# Patient Record
Sex: MALE | Race: WHITE | NOT HISPANIC OR LATINO | Employment: UNEMPLOYED | URBAN - METROPOLITAN AREA
[De-identification: names, ages, dates, MRNs, and addresses within clinical notes are randomized per-mention and may not be internally consistent; named-entity substitution may affect disease eponyms.]

---

## 2018-05-09 ENCOUNTER — OFFICE VISIT (OUTPATIENT)
Dept: MEDICAL GROUP | Facility: MEDICAL CENTER | Age: 58
End: 2018-05-09
Payer: COMMERCIAL

## 2018-05-09 VITALS
BODY MASS INDEX: 21.72 KG/M2 | HEIGHT: 67 IN | SYSTOLIC BLOOD PRESSURE: 106 MMHG | TEMPERATURE: 98 F | RESPIRATION RATE: 16 BRPM | HEART RATE: 58 BPM | WEIGHT: 138.4 LBS | OXYGEN SATURATION: 98 % | DIASTOLIC BLOOD PRESSURE: 60 MMHG

## 2018-05-09 DIAGNOSIS — Z13.6 SCREENING FOR ISCHEMIC HEART DISEASE: ICD-10-CM

## 2018-05-09 DIAGNOSIS — F41.9 ANXIETY: ICD-10-CM

## 2018-05-09 DIAGNOSIS — L65.9 HAIR LOSS: ICD-10-CM

## 2018-05-09 DIAGNOSIS — R06.83 SNORING: ICD-10-CM

## 2018-05-09 DIAGNOSIS — R53.82 CHRONIC FATIGUE: ICD-10-CM

## 2018-05-09 DIAGNOSIS — Z13.1 SCREENING FOR DIABETES MELLITUS: ICD-10-CM

## 2018-05-09 DIAGNOSIS — J32.0 CHRONIC MAXILLARY SINUSITIS: ICD-10-CM

## 2018-05-09 DIAGNOSIS — Z72.89 OTHER PROBLEMS RELATED TO LIFESTYLE: ICD-10-CM

## 2018-05-09 PROBLEM — J32.9 SINUSITIS: Status: ACTIVE | Noted: 2018-05-09

## 2018-05-09 PROCEDURE — 99204 OFFICE O/P NEW MOD 45 MIN: CPT | Performed by: FAMILY MEDICINE

## 2018-05-09 RX ORDER — FINASTERIDE 5 MG/1
5 TABLET, FILM COATED ORAL DAILY
COMMUNITY
Start: 2017-06-03 | End: 2019-11-12

## 2018-05-09 ASSESSMENT — PATIENT HEALTH QUESTIONNAIRE - PHQ9: CLINICAL INTERPRETATION OF PHQ2 SCORE: 0

## 2018-05-09 ASSESSMENT — PAIN SCALES - GENERAL: PAINLEVEL: NO PAIN

## 2018-05-09 NOTE — ASSESSMENT & PLAN NOTE
Patient complains of chronic fatigue. Interestingly, he states the fatigue is localized around the left side of his head and face. He isn't sure if this is related to his sinus issues. He wonders if he might have low thyroid.

## 2018-05-09 NOTE — ASSESSMENT & PLAN NOTE
The patient describes a long-standing history of moderate severity anxiety with OCD tendencies. He describes these tendencies as compulsion to bite his mouth. He denies depression. He has tried Zoloft in the past but could not tolerate it due to digestive issues. He notices that exercise does help his symptoms.

## 2018-05-09 NOTE — LETTER
[facility]  Kunal Quach M.D.  [address]  [facility] [address]  Fax: [phone]   Authorization for Release/Disclosure of   Protected Health Information   Name: VENKATA FUNES : 1960 SSN: [id number]   Address: [address] [place] [address] Phone:    [phone] (home)    I authorize the entity listed below to release/disclose the PHI below to:   [facility]/Kunal Quach M.D. and Kunal Quach M.D.   Provider or Entity Name:  Dr.Norman Guillaume   Address   City, [facility], [place], [place] Phone:      Fax:     Reason for request: continuity of care   Information to be released:    [  ] LAST COLONOSCOPY,  including any PATH REPORT and follow-up  [  ] LAST FIT/COLOGUARD RESULT [  ] LAST DEXA  [  ] LAST MAMMOGRAM  [  ] LAST PAP  [  ] LAST LABS [  ] RETINA EXAM REPORT  [  ] IMMUNIZATION RECORDS  [X  ] Release all info      [  ] Check here and initial the line next to each item to release ALL health information INCLUDING  _____ Care and treatment for drug and / or alcohol abuse  _____ HIV testing, infection status, or AIDS  _____ Genetic Testing    DATES OF SERVICE OR TIME PERIOD TO BE DISCLOSED: _____________  I understand and acknowledge that:  * This Authorization may be revoked at any time by you in writing, except if your health information has already been used or disclosed.  * Your health information that will be used or disclosed as a result of you signing this authorization could be re-disclosed by the recipient. If this occurs, your re-disclosed health information may no longer be protected by State or Federal laws.  * You may refuse to sign this Authorization. Your refusal will not affect your ability to obtain treatment.  * This Authorization becomes effective upon signing and will  on (date) __________.      If no date is indicated, this Authorization will  one (1) year from the signature date.    Name: Venkata Funes    Signature:   Date:          5/9/2018       PLEASE FAX REQUESTED RECORDS BACK TO: [phone]

## 2018-05-09 NOTE — PROGRESS NOTES
[facility]  Progress Note  New Patient    Subjective:   Venkata Funes is a 57 y.o. male here today with a chief complaint of anxiety. This is a new patient to me. The patient comes in alone.     Anxiety  The patient describes a long-standing history of moderate severity anxiety with OCD tendencies. He describes these tendencies as compulsion to bite his mouth. He denies depression. He has tried Zoloft in the past but could not tolerate it due to digestive issues. He notices that exercise does help his symptoms.    Sinusitis  Patient describes a long-standing history of sinusitis, worse in the left maxillary area. The patient has had a number of sinus surgeries but none of them have been that effective. He is tried nasal sprays and Neti pot with limited success. He continues to complain of intermittent left maxillary pressure.    Chronic fatigue  Patient complains of chronic fatigue. Interestingly, he states the fatigue is localized around the left side of his head and face. He isn't sure if this is related to his sinus issues. He wonders if he might have low thyroid.    Hair loss  He takes minoxidil and finasteride for hair loss. He tolerates this medication well.    Snoring  The patient snores and expresses fatigue, discussed elsewhere.      No current outpatient prescriptions on file prior to visit.     No current facility-administered medications on file prior to visit.        Past Medical History:   Diagnosis Date   • Allergy    • Anxiety    • Cataract    • Sinusitis        Allergies: Patient has no known allergies.    Surgical History:  has a past surgical history that includes cataract extraction with iol; nasal septal reconstruction; eye surgery; and vasectomy.    Family History: family history includes Depression in his daughter.    Social History:  reports that he has never smoked. He has never used smokeless tobacco. He reports that he drinks alcohol. He reports that he does not use  "drugs.    ROS:   Constitutional ROS: No unexplained fevers, sweats. Occasional chills.  Eye ROS: No eye pain, redness, discharge  Ear ROS: No ear pain  Mouth/Throat ROS: No sore throat  Neck ROS: No swollen glands  Pulmonary ROS: No shortness of breath  Cardiovascular ROS: No chest pain  Gastrointestinal ROS: No abdominal pain  Musculoskeletal/Extremities ROS: No cyanosis  Neurologic ROS: No seizures  Psychiatric ROS: No depression       Objective:     Vitals:    05/09/18 1014   BP: 106/60   Pulse: (!) 58   Resp: 16   Temp: 36.7 °C (98 °F)   SpO2: 98%   Weight: 62.8 kg (138 lb 6.4 oz)   Height: 1.702 m (5' 7\")       Physical Exam:  Constitutional: Alert, no distress.  Skin: Warm, dry, good turgor, no rashes in visible areas.  Eye: Equal, round and reactive, conjunctiva clear, lids normal.  ENMT: Lips without lesions, good dentition, oropharynx clear with cobblestoning and bilateral hypertrophic nasal turbinates.  Neck: Trachea midline, no masses, no thyromegaly. No cervical or supraclavicular lymphadenopathy  Respiratory: Unlabored respiratory effort, lungs clear to auscultation, no wheezes, no ronchi.  Cardiovascular: Normal S1, S2, no murmur, no edema.  Abdomen: Soft, non-tender, no masses, no hepatosplenomegaly.  Psych: Alert and oriented, anxious affect and mood.         Assessment and Plan:     1. Chronic maxillary sinusitis  - continue nasal steroids, NetiPot  - REFERRAL TO ENT, to Dr. Malave.    2. Chronic fatigue  An unusual description. May be related to sinusitis. Will also perform workup below.   - HEMOGLOBIN AND HEMATOCRIT; Future  - TSH WITH REFLEX TO FT4; Future  - REFERRAL TO SLEEP STUDIES    3. Hair loss  - continue current regimen.     4. Snoring  - REFERRAL TO SLEEP STUDIES    5. Screening for ischemic heart disease  - LIPID PROFILE; Future    6. Screening for diabetes mellitus  - BASIC METABOLIC PANEL; Future    7. Other problems related to lifestyle  - HEP C VIRUS ANTIBODY; Future    8. " Anxiety  Failed zoloft. I think he'd benefit from CBT, especially considering his obsessive compulsive tendencies.   - REFERRAL TO PSYCHOLOGY, to PhD specializing in CBT.        Followup: Return in about 2 months (around 7/9/2018), or if symptoms worsen or fail to improve.

## 2018-05-09 NOTE — LETTER
[facility]  Kunal Quach M.D.  [address]  [facility] [address]  Fax: [phone]   Authorization for Release/Disclosure of   Protected Health Information   Name: VENKATA FUNES : 1960 SSN: [id number]   Address: [address] [place] [address] Phone:    [phone] (home)    I authorize the entity listed below to release/disclose the PHI below to:   [facility]/Kunal Quach M.D. and Kunal Quach M.D.   Provider or Entity Name:     Address   City, [facility], [place], [place] Phone:      Fax:     Reason for request: continuity of care   Information to be released:    [X  ] LAST COLONOSCOPY,  including any PATH REPORT and follow-up  [  ] LAST FIT/COLOGUARD RESULT [  ] LAST DEXA  [  ] LAST MAMMOGRAM  [  ] LAST PAP  [  ] LAST LABS [  ] RETINA EXAM REPORT  [  ] IMMUNIZATION RECORDS  [  ] Release all info      [  ] Check here and initial the line next to each item to release ALL health information INCLUDING  _____ Care and treatment for drug and / or alcohol abuse  _____ HIV testing, infection status, or AIDS  _____ Genetic Testing    DATES OF SERVICE OR TIME PERIOD TO BE DISCLOSED: _____________  I understand and acknowledge that:  * This Authorization may be revoked at any time by you in writing, except if your health information has already been used or disclosed.  * Your health information that will be used or disclosed as a result of you signing this authorization could be re-disclosed by the recipient. If this occurs, your re-disclosed health information may no longer be protected by State or Federal laws.  * You may refuse to sign this Authorization. Your refusal will not affect your ability to obtain treatment.  * This Authorization becomes effective upon signing and will  on (date) __________.      If no date is indicated, this Authorization will  one (1) year from the signature date.    Name: Venkata Funes    Signature:   Date:      5/9/2018       PLEASE FAX REQUESTED RECORDS BACK TO: [phone]

## 2018-05-09 NOTE — ASSESSMENT & PLAN NOTE
Patient describes a long-standing history of sinusitis, worse in the left maxillary area. The patient has had a number of sinus surgeries but none of them have been that effective. He is tried nasal sprays and Neti pot with limited success. He continues to complain of intermittent left maxillary pressure.

## 2018-07-09 ENCOUNTER — OFFICE VISIT (OUTPATIENT)
Dept: MEDICAL GROUP | Facility: MEDICAL CENTER | Age: 58
End: 2018-07-09
Payer: COMMERCIAL

## 2018-07-09 VITALS
DIASTOLIC BLOOD PRESSURE: 70 MMHG | HEIGHT: 67 IN | SYSTOLIC BLOOD PRESSURE: 110 MMHG | WEIGHT: 137.79 LBS | HEART RATE: 64 BPM | RESPIRATION RATE: 18 BRPM | TEMPERATURE: 97.2 F | BODY MASS INDEX: 21.63 KG/M2 | OXYGEN SATURATION: 97 %

## 2018-07-09 DIAGNOSIS — J32.0 CHRONIC MAXILLARY SINUSITIS: ICD-10-CM

## 2018-07-09 DIAGNOSIS — F41.9 ANXIETY: ICD-10-CM

## 2018-07-09 DIAGNOSIS — Z00.00 HEALTHCARE MAINTENANCE: ICD-10-CM

## 2018-07-09 DIAGNOSIS — E78.5 DYSLIPIDEMIA: ICD-10-CM

## 2018-07-09 DIAGNOSIS — R00.2 PALPITATIONS: ICD-10-CM

## 2018-07-09 PROCEDURE — 99214 OFFICE O/P EST MOD 30 MIN: CPT | Mod: 25 | Performed by: FAMILY MEDICINE

## 2018-07-09 PROCEDURE — 93000 ELECTROCARDIOGRAM COMPLETE: CPT | Performed by: FAMILY MEDICINE

## 2018-07-09 NOTE — ASSESSMENT & PLAN NOTE
The patient has slight dyslipidemia with a 10 year ASCVD risk of 4.66%. He denies chest pain and shortness of breath.

## 2018-07-09 NOTE — ASSESSMENT & PLAN NOTE
The patient states that recently he has noticed occasional palpitations. He states that about one time per day he notices a skipped beat. Sometimes she also has presyncopal symptoms but denies any syncopal activity. He states that he will get occasionally lightheaded when he stands up from a bending over position. He denies associated chest pain or shortness of breath. Recent CMP, TSH and H/H were normal.

## 2018-07-09 NOTE — ASSESSMENT & PLAN NOTE
Patient recently saw Dr. Malave (ENT) for his sinusitis. Dr. Malave is not interested in doing surgery. He did do a CT scan.

## 2018-07-09 NOTE — ASSESSMENT & PLAN NOTE
Lipids: done 2018 with 10 year ASCVD risk 4.66%. No indication for ASA or statin.   Fasting Glucose: done 2018 and normal.    Hepatitis C Screen: done 2018 and normal.   Colonoscopy: done 11/16/17 with 5 year recall.     Tdap: given 2012.

## 2018-07-09 NOTE — ASSESSMENT & PLAN NOTE
The patient has made contact with a therapist for his anxiety and OCD symptoms. He is not sure if this is helping.

## 2018-07-09 NOTE — PROGRESS NOTES
[facility]  Progress Note  Established Patient    Subjective:   Venkata Funes is a 57 y.o. male here today with a chief complaint of palpitations. The patient is alone.     Healthcare maintenance  Lipids: done 2018 with 10 year ASCVD risk 4.66%. No indication for ASA or statin.   Fasting Glucose: done 2018 and normal.    Hepatitis C Screen: done 2018 and normal.   Colonoscopy: done 11/16/17 with 5 year recall.     Tdap: given 2012.     Palpitations  The patient states that recently he has noticed occasional palpitations. He states that about one time per day he notices a skipped beat. Sometimes she also has presyncopal symptoms but denies any syncopal activity. He states that he will get occasionally lightheaded when he stands up from a bending over position. He denies associated chest pain or shortness of breath. Recent CMP, TSH and H/H were normal.     Sinusitis  Patient recently saw Dr. Malave (ENT) for his sinusitis. Dr. Malave is not interested in doing surgery. He did do a CT scan.     Dyslipidemia  The patient has slight dyslipidemia with a 10 year ASCVD risk of 4.66%. He denies chest pain and shortness of breath.    Anxiety  The patient has made contact with a therapist for his anxiety and OCD symptoms. He is not sure if this is helping.      Current Outpatient Prescriptions on File Prior to Visit   Medication Sig Dispense Refill   • finasteride (PROSCAR) 5 MG Tab Take 5 mg by mouth every day.     • MINOXIDIL EX 0.1 mL by Apply externally route every day.       No current facility-administered medications on file prior to visit.        Past Medical History:   Diagnosis Date   • Allergy    • Anxiety    • Cataract    • Sinusitis        Allergies: Patient has no known allergies.    Surgical History:  has a past surgical history that includes cataract extraction with iol; nasal septal reconstruction; eye surgery; and vasectomy.    Family History: family history includes Depression in his  "daughter.    Social History:  reports that he has never smoked. He has never used smokeless tobacco. He reports that he drinks alcohol. He reports that he does not use drugs.    ROS: no CP or SOB.        Objective:     Vitals:    07/09/18 0948   BP: 110/70   Pulse: 64   Resp: 18   Temp: 36.2 °C (97.2 °F)   SpO2: 97%   Weight: 62.5 kg (137 lb 12.6 oz)   Height: 1.702 m (5' 7\")       Physical Exam:  General: alert in no apparent distress.   Cardio: regular rate and rhythm, no murmurs, rubs or gallops.   Resp: CTAB no w/r/r.   Cardio: RRR no m/r/g. No carotid bruit.     EKG: precordial upsloping BINA not changed from previous EKG.     Orthostatics: 98/68 66, 102/70 68, 10/72 80        Assessment and Plan:     1. Dyslipidemia  No indication for ASA or statin.   - continue diet and exercise.     2. Anxiety  - f/u psychology.     3. Healthcare maintenance  - see HPI.     4. Palpitations  With presyncope that sounds orthostatic in nature. No syncope. Exam normal. EKG reassuring. Orthostatics suggestive of orthostasis.  - 2-3 liters water per day with salty snack.   - holter monitor.   - call if worsens or new sx.   - avoid over-exertion.      5. Chronic maxillary sinusitis  - f/u ENT.   - records requested.         Followup: Return in about 2 months (around 9/9/2018), or if symptoms worsen or fail to improve.         "

## 2018-08-10 ENCOUNTER — NON-PROVIDER VISIT (OUTPATIENT)
Dept: CARDIOLOGY | Facility: MEDICAL CENTER | Age: 58
End: 2018-08-10
Payer: COMMERCIAL

## 2018-08-10 DIAGNOSIS — R00.2 PALPITATIONS: ICD-10-CM

## 2018-08-21 LAB — EKG IMPRESSION: NORMAL

## 2018-08-21 PROCEDURE — 93224 XTRNL ECG REC UP TO 48 HRS: CPT | Performed by: INTERNAL MEDICINE

## 2018-08-29 ENCOUNTER — APPOINTMENT (OUTPATIENT)
Dept: SLEEP MEDICINE | Facility: MEDICAL CENTER | Age: 58
End: 2018-08-29
Payer: COMMERCIAL

## 2018-09-10 ENCOUNTER — OFFICE VISIT (OUTPATIENT)
Dept: MEDICAL GROUP | Facility: MEDICAL CENTER | Age: 58
End: 2018-09-10
Payer: COMMERCIAL

## 2018-09-10 VITALS
TEMPERATURE: 98.4 F | DIASTOLIC BLOOD PRESSURE: 62 MMHG | OXYGEN SATURATION: 97 % | HEART RATE: 60 BPM | HEIGHT: 67 IN | BODY MASS INDEX: 21.56 KG/M2 | SYSTOLIC BLOOD PRESSURE: 122 MMHG | WEIGHT: 137.4 LBS

## 2018-09-10 DIAGNOSIS — Z23 NEED FOR VACCINATION: ICD-10-CM

## 2018-09-10 DIAGNOSIS — R53.82 CHRONIC FATIGUE: ICD-10-CM

## 2018-09-10 DIAGNOSIS — J32.0 CHRONIC MAXILLARY SINUSITIS: ICD-10-CM

## 2018-09-10 DIAGNOSIS — R00.2 PALPITATIONS: ICD-10-CM

## 2018-09-10 DIAGNOSIS — E78.5 DYSLIPIDEMIA: ICD-10-CM

## 2018-09-10 PROCEDURE — 90471 IMMUNIZATION ADMIN: CPT | Performed by: FAMILY MEDICINE

## 2018-09-10 PROCEDURE — 90686 IIV4 VACC NO PRSV 0.5 ML IM: CPT | Performed by: FAMILY MEDICINE

## 2018-09-10 PROCEDURE — 99214 OFFICE O/P EST MOD 30 MIN: CPT | Mod: 25 | Performed by: FAMILY MEDICINE

## 2018-09-10 NOTE — ASSESSMENT & PLAN NOTE
"For the past few months the patient has described \"fatigue\" and \"fuzziness\" localized around the left side of his head and face. He does have sinusitis and is seeing ENT for this issue. He also has an upcoming sleep study. He states that his symptoms are much better, however, on occasion he will notice them. He also has anxiety.  "

## 2018-09-10 NOTE — ASSESSMENT & PLAN NOTE
At the last visit the patient described occasional palpitations and lightheadedness. H&H, CMP and TSH are normal. Holter monitoring is reassuring. The patient states the symptoms are much better now.

## 2018-09-10 NOTE — ASSESSMENT & PLAN NOTE
"Patient recently saw Dr. Malave (ENT) for his sinusitis. They elected to treat this with nasal saline rinses and flonase. The patient states that his symptoms are under reasonable control. He also describes some \"fatigue\" in the left frontal sinus area. See discussion regarding \"chronic fatigue\".   "

## 2018-09-10 NOTE — PROGRESS NOTES
"[facility]  Progress Note  Established Patient    Subjective:   Venkata Funes is a 57 y.o. male here today with a chief complaint of sinusitis. The patient is alone.     Sinusitis  Patient recently saw Dr. Malave (ENT) for his sinusitis. They elected to treat this with nasal saline rinses and flonase. The patient states that his symptoms are under reasonable control. He also describes some \"fatigue\" in the left frontal sinus area. See discussion regarding \"chronic fatigue\".     Palpitations  At the last visit the patient described occasional palpitations and lightheadedness. H&H, CMP and TSH are normal. Holter monitoring is reassuring. The patient states the symptoms are much better now.    Dyslipidemia  The patient has slight dyslipidemia with a 10 year ASCVD risk of 4.66%. He tries to stay active.    Chronic fatigue  For the past few months the patient has described \"fatigue\" and \"fuzziness\" localized around the left side of his head and face. He does have sinusitis and is seeing ENT for this issue. He also has an upcoming sleep study. He states that his symptoms are much better, however, on occasion he will notice them. He also has anxiety.      Current Outpatient Prescriptions on File Prior to Visit   Medication Sig Dispense Refill   • finasteride (PROSCAR) 5 MG Tab Take 5 mg by mouth every day.     • MINOXIDIL EX 0.1 mL by Apply externally route every day.       No current facility-administered medications on file prior to visit.        Past Medical History:   Diagnosis Date   • Allergy    • Anxiety    • Cataract    • Sinusitis        Allergies: Patient has no known allergies.    Surgical History:  has a past surgical history that includes cataract extraction with iol; nasal septal reconstruction; eye surgery; and vasectomy.    Family History: family history includes Depression in his daughter.    Social History:  reports that he has never smoked. He has never used smokeless tobacco. He reports that he " "drinks alcohol. He reports that he does not use drugs.    ROS: no fever or nausea.        Objective:     Vitals:    09/10/18 1027   BP: 122/62   Pulse: 60   Temp: 36.9 °C (98.4 °F)   SpO2: 97%   Weight: 62.3 kg (137 lb 6.4 oz)   Height: 1.702 m (5' 7\")       Physical Exam:  General: alert in no apparent distress.   Gait: normal.         Assessment and Plan:     1. Need for vaccination  - INFLUENZA VACCINE QUAD INJ >3Y(PF)    2. Chronic maxillary sinusitis  - f/u ENT.     3. Palpitations  Holter reassuring, symptoms greatly improved.   - supportive care.     4. Dyslipidemia  - diet and exercise.     5. Chronic fatigue  See HPI for description. DDx includes sinusitis. Also consider anxiety. I wonder if sleep apnea is playing a role. Nonetheless, I'm reassured that he's doing better.   - will treat sleep apnea and see how he does from there. If no further improvement, may consider further labs or possibly imaging.        Followup: Return in about 4 months (around 1/10/2019), or if symptoms worsen or fail to improve, for Wellness Visit, Long.         "

## 2018-10-03 ENCOUNTER — SLEEP CENTER VISIT (OUTPATIENT)
Dept: SLEEP MEDICINE | Facility: MEDICAL CENTER | Age: 58
End: 2018-10-03
Payer: COMMERCIAL

## 2018-10-03 VITALS
HEIGHT: 67 IN | RESPIRATION RATE: 15 BRPM | HEART RATE: 56 BPM | SYSTOLIC BLOOD PRESSURE: 114 MMHG | WEIGHT: 134 LBS | DIASTOLIC BLOOD PRESSURE: 70 MMHG | BODY MASS INDEX: 21.03 KG/M2 | OXYGEN SATURATION: 97 %

## 2018-10-03 DIAGNOSIS — G47.30 SLEEP DISORDER BREATHING: ICD-10-CM

## 2018-10-03 PROCEDURE — 99203 OFFICE O/P NEW LOW 30 MIN: CPT | Performed by: FAMILY MEDICINE

## 2018-10-03 RX ORDER — IBUPROFEN 200 MG
200 TABLET ORAL EVERY 6 HOURS PRN
COMMUNITY

## 2018-10-03 NOTE — PROGRESS NOTES
"     [facility]  Consult Note     Date: 10/3/2018 / Time: 1:53 PM    Patient ID:   Name:             Venkata Funes     YOB: 1960  Age:                 57 y.o.  male   MRN:               [id number]      Thank you for requesting a sleep medicine consultation on Venkata Funes at the sleep center. He presents today with the chief complaints of snoring, witnessed apneas and excessive daytime sleepiness. He is referred by  for evaluation and treatment of sleep disorder breathing .     HISTORY OF PRESENT ILLNESS:       At night,  Venkata Funes goes to bed around 10:30 pm on weekdays and pm on weekends. He gets out of bed at 8 am on weekdays and on weekends.He averages 9.5 hrs of sleep on a good night and 7 hrs on a bad night. Pt has bad nights rare.He falls asleep within 5 minutes. He awakens 1-2 times a night due to no obvious reason. It takes him less than 5 min to fall back asleep.    He is not aware of snoring/gasping or choking in sleep.  He  denies any symptoms of restless legs syndrome such as an \"urge to move\"  He  legs in the evening or bedtime. He  denies any symptoms of narcolepsy such as sleep paralysis or cataplexy, or any symptoms to suggest parasomnias such as sleep walking or acting out of dreams. He   Uses benadryl and tylenol pm once a week as a sleep aide.    Overall,  He doesnot finds his sleep refreshing. When He  wakes up in the morning, He does feel tired. In terms of  excessive daytime sleepiness,  He complains of sleepiness while reading or watching TV . [place] sleepiness scale score is abnormal at  14/24.   He does not take regular naps.     REVIEW OF SYSTEMS:       Constitutional: Denies fevers, Denies weight changes  Eyes: Denies changes in vision, no eye pain  Ears/Nose/Throat/Mouth: Denies nasal congestion or sore throat   Cardiovascular: Denies chest pain or palpitations   Respiratory: Denies shortness of breath , Denies " "cough  Gastrointestinal/Hepatic: Denies abdominal pain, nausea, vomiting, diarrhea, constipation or GI bleeding   Genitourinary: Denies bladder dysfunction, dysuria or frequency  Musculoskeletal/Rheum: Denies  joint pain and swelling   Skin/Breast: Denies rash, denies breast lumps or discharge  Neurological: Denies headache, confusion, memory loss or focal weakness/parasthesias  Psychiatric: denies mood disorder     Comprehensive review of systems form is reviewed with the patient and is attached in the EMR.     PMH:  has a past medical history of Allergy; Anxiety; Back pain; Cataract; Chickenpox; and Sinusitis.  MEDS:   Current Outpatient Prescriptions:   •  CREATINE MONOHYDRATE PO, Take  by mouth., Disp: , Rfl:   •  Multiple Vitamin (MULTI-VITAMIN PO), Take  by mouth., Disp: , Rfl:   •  ibuprofen (MOTRIN) 200 MG Tab, Take 200 mg by mouth every 6 hours as needed., Disp: , Rfl:   •  Diphenhydramine-APAP, sleep, (TYLENOL PM EXTRA STRENGTH PO), Take  by mouth., Disp: , Rfl:   •  finasteride (PROSCAR) 5 MG Tab, Take 5 mg by mouth every day., Disp: , Rfl:   •  MINOXIDIL EX, 0.1 mL by Apply externally route every day., Disp: , Rfl:   ALLERGIES: No Known Allergies  SURGHX:   Past Surgical History:   Procedure Laterality Date   • CATARACT EXTRACTION WITH IOL     • EYE SURGERY     • NASAL SEPTAL RECONSTRUCTION     • VASECTOMY       SOCHX:  reports that he has never smoked. He has never used smokeless tobacco. He reports that he drinks alcohol. He reports that he does not use drugs...   FH:   Family History   Problem Relation Age of Onset   • Depression Daughter    • Heart Failure Father    • Stroke Neg Hx            Physical Exam:  Vitals/ General Appearance:   Weight/BMI: Body mass index is 20.99 kg/m².  Blood pressure 114/70, pulse (!) 56, resp. rate 15, height 1.702 m (5' 7\"), weight 60.8 kg (134 lb), SpO2 97 %.  Vitals:    10/03/18 1344   BP: 114/70   BP Location: Left arm   Patient Position: Sitting   BP Cuff Size: " "Adult   Pulse: (!) 56   Resp: 15   SpO2: 97%   Weight: 60.8 kg (134 lb)   Height: 1.702 m (5' 7\")       Pt. is alert and oriented to time, place and person. Cooperative and in no apparent distress.       1. Head: Atraumatic, normocephalic.   2. Ears: Normal tympanic membrane and no discharge  3. Nose: No inferior turbinate hypertophy, +septal deviation  4. Throat: Oropharynx appears crowded in that the palate is overhanging (Mal[place] scale 3. Tonsils are not enlarged, uvula is large, pharynx not inflamed. Tongue is large. has intact dentition and oral hygiene is fair.  5. Neck: Supple. No thyromegaly  6. Chest: Trachea central  7. Lungs auscultation: B/L good air entry, vesicular breath sounds, no adventitious sounds  8. Heart auscultation: 1st and 2nd heart sounds normal, regular rhythm. No appreciable murmur.  9.  Extremities:  no clubbing, no pedal edema.   Peripheral pulses felt.  10. Skin: No rash  11. NEUROLOGICAL EXAMINATION: On neurological exam, the patient was alert and oriented x3. speech was clear and fluent without dysarthria.     INVESTIGATIONS:       ASSESSMENT AND PLAN     1. He  has symptoms of Obstructive Sleep Apnea (DONNIE). He  has excessive daytime sleepiness that interferes with activites of daily living. He  risk factors for DONNIE include crowded oropharynx.     The pathophysiology of DONNIE and the increased risk of cardiovascular morbidity from untreated DONNIE is discussed in detail with the patient.     We have discussed diagnostic options including in-laboratory, attended polysomnography and home sleep testing. We have also discussed treatment options including airway pressurization, reconstructive otolaryngologic surgery, dental appliances and weight management.       Subsequently,treatment options will be discussed based on the diagnostic study. Meanwhile, He is urged to avoid supine sleep, weight gain and alcoholic beverages since all of these can worsen DONNIE. He is cautioned against drowsy " driving. If He feels sleepy while driving, He must pull over for a break/nap, rather than persist on the road, in the interest of He own safety and that of others on the road.    Plan  -  He  will be scheduled for an overnight HST to assess sleep related breathing disorder.    2.Regarding treatment of other past medical problems and general health maintenance,  He is urged to follow up with PCP.

## 2018-10-03 NOTE — PATIENT INSTRUCTIONS
Sleep hygiene (ref: UpToDate)  ?Sleep as long as necessary to feel rested (usually seven to eight hours for adults) and then get out of bed  ?Maintain a regular sleep schedule, particularly a regular wake-up time in the morning  ?Try not to force sleep  ?Avoid caffeinated beverages after lunch  ?Avoid alcohol near bedtime (eg, late afternoon and evening)  ?Avoid smoking or other nicotine intake, particularly during the evening  ?Adjust the bedroom environment as needed to decrease stimuli (eg, reduce ambient light, turn off the television or radio)  ?Avoid use of light-emitting screens  (laptops, tablets, smartphones, MobiWorkooks) 2 hours before bedtime   ?Resolve concerns or worries before bedtime  ?Exercise regularly for at least 20 minutes, preferably more than four to five hours prior to bedtime.  ?Avoid daytime naps, especially if they are longer than 20 to 30 minutes or occur late in the day    Stimulus control (Ref: UpToDate)    Patients with insomnia may associate their bed and bedroom with the fear of not sleeping or other arousing events, rather than the more pleasurable anticipation of sleep. The longer one stays in bed trying to sleep, the stronger the association becomes. This perpetuates the difficulty falling asleep.Stimulus control therapy is a strategy whose purpose is to disrupt this association by enhancing the likelihood of sleep . Patients should not go to bed until they are sleepy and should use the bed primarily for sleep (and not for reading, watching television, eating, or worrying). They should not spend more than 20 minutes in bed awake. If they are awake after 20 minutes, they should leave the bedroom and engage in a relaxing activity, such as reading or listening to soothing music. Patients should not engage in activities that stimulate them or reward them for being awake in the middle of the night, such as eating or watching television. In addition, they should not return to bed until they  are tired and feel ready to sleep. If they return to bed and still cannot sleep within 20 minutes, the process should be repeated. An alarm should be set to wake the patient at the same time every morning, including weekends. Daytime naps are not allowed.    Sleep Apnea  Sleep apnea is a condition in which breathing pauses or becomes shallow during sleep. Episodes of sleep apnea usually last 10 seconds or longer, and they may occur as many as 20 times an hour. Sleep apnea disrupts your sleep and keeps your body from getting the rest that it needs. This condition can increase your risk of certain health problems, including:  · Heart attack.  · Stroke.  · Obesity.  · Diabetes.  · Heart failure.  · Irregular heartbeat.  There are three kinds of sleep apnea:  · Obstructive sleep apnea. This kind is caused by a blocked or collapsed airway.  · Central sleep apnea. This kind happens when the part of the brain that controls breathing does not send the correct signals to the muscles that control breathing.  · Mixed sleep apnea. This is a combination of obstructive and central sleep apnea.  What are the causes?  The most common cause of this condition is a collapsed or blocked airway. An airway can collapse or become blocked if:  · Your throat muscles are abnormally relaxed.  · Your tongue and tonsils are larger than normal.  · You are overweight.  · Your airway is smaller than normal.  What increases the risk?  This condition is more likely to develop in people who:  · Are overweight.  · Smoke.  · Have a smaller than normal airway.  · Are elderly.  · Are male.  · Drink alcohol.  · Take sedatives or tranquilizers.  · Have a family history of sleep apnea.  What are the signs or symptoms?  Symptoms of this condition include:  · Trouble staying asleep.  · Daytime sleepiness and tiredness.  · Irritability.  · Loud snoring.  · Morning headaches.  · Trouble concentrating.  · Forgetfulness.  · Decreased interest in  sex.  · Unexplained sleepiness.  · Mood swings.  · Personality changes.  · Feelings of depression.  · Waking up often during the night to urinate.  · Dry mouth.  · Sore throat.  How is this diagnosed?  This condition may be diagnosed with:  · A medical history.  · A physical exam.  · A series of tests that are done while you are sleeping (sleep study). These tests are usually done in a sleep lab, but they may also be done at home.  How is this treated?  Treatment for this condition aims to restore normal breathing and to ease symptoms during sleep. It may involve managing health issues that can affect breathing, such as high blood pressure or obesity. Treatment may include:  · Sleeping on your side.  · Using a decongestant if you have nasal congestion.  · Avoiding the use of depressants, including alcohol, sedatives, and narcotics.  · Losing weight if you are overweight.  · Making changes to your diet.  · Quitting smoking.  · Using a device to open your airway while you sleep, such as:  ¨ An oral appliance. This is a custom-made mouthpiece that shifts your lower jaw forward.  ¨ A continuous positive airway pressure (CPAP) device. This device delivers oxygen to your airway through a mask.  ¨ A nasal expiratory positive airway pressure (EPAP) device. This device has valves that you put into each nostril.  ¨ A bi-level positive airway pressure (BPAP) device. This device delivers oxygen to your airway through a mask.  · Surgery if other treatments do not work. During surgery, excess tissue is removed to create a wider airway.  It is important to get treatment for sleep apnea. Without treatment, this condition can lead to:  · High blood pressure.  · Coronary artery disease.  · (Men) An inability to achieve or maintain an erection (impotence).  · Reduced thinking abilities.  Follow these instructions at home:  · Make any lifestyle changes that your health care provider recommends.  · Eat a healthy, well-balanced  diet.  · Take over-the-counter and prescription medicines only as told by your health care provider.  · Avoid using depressants, including alcohol, sedatives, and narcotics.  · Take steps to lose weight if you are overweight.  · If you were given a device to open your airway while you sleep, use it only as told by your health care provider.  · Do not use any tobacco products, such as cigarettes, chewing tobacco, and e-cigarettes. If you need help quitting, ask your health care provider.  · Keep all follow-up visits as told by your health care provider. This is important.  Contact a health care provider if:  · The device that you received to open your airway during sleep is uncomfortable or does not seem to be working.  · Your symptoms do not improve.  · Your symptoms get worse.  Get help right away if:  · You develop chest pain.  · You develop shortness of breath.  · You develop discomfort in your back, arms, or stomach.  · You have trouble speaking.  · You have weakness on one side of your body.  · You have drooping in your face.  These symptoms may represent a serious problem that is an emergency. Do not wait to see if the symptoms will go away. Get medical help right away. Call your local emergency services (911 in the [place] Do not drive yourself to the hospital.   This information is not intended to replace advice given to you by your health care provider. Make sure you discuss any questions you have with your health care provider.  Document Released: 12/08/2003 Document Revised: 08/13/2017 Document Reviewed: 09/26/2016  [employer] Interactive Patient Education © 2017 [employer]

## 2018-10-31 ENCOUNTER — HOME STUDY (OUTPATIENT)
Dept: SLEEP MEDICINE | Facility: MEDICAL CENTER | Age: 58
End: 2018-10-31
Attending: FAMILY MEDICINE
Payer: COMMERCIAL

## 2018-10-31 DIAGNOSIS — G47.30 SLEEP DISORDER BREATHING: ICD-10-CM

## 2018-10-31 PROCEDURE — 95806 SLEEP STUDY UNATT&RESP EFFT: CPT | Performed by: INTERNAL MEDICINE

## 2018-11-05 NOTE — PROCEDURES
Interpretation:    This home sleep study was performed on 10/31/2018 using a [employer] NightOne recording device.  The monitoring time was 534.6 minutes.  The patient's respiratory event index was 10.7, the oxygen nicole was 86%, the obstructive apnea index is 1.6, and the central apnea index was 0.4.  The patient experienced a total of 95 respiratory events.  The supine RDI was 31.9.  The patient experienced 97 desaturations and the desaturation index was 11.0.  The mean heart rate during sleep was 59 bpm and the lowest heart rate was 47 with the highest heart rate being 85 bpm.    Assessment:    Mild obstructive sleep apnea and mild oxygen desaturation.    Recommendation:    If significant signs, symptoms, and or comorbidities warrant, recommend a positive airway pressure titration. Alternative treatments for OSAH include behavioral modification, use of a dental appliance, and nasopharyngeal reconstructive surgery. Behavior modification includes weight loss, eliminating alcohol and sedatives, and avoiding the supine position. If alternative treatments are used, a follow-up diagnostic study is warranted to ensure efficacy.

## 2019-01-10 ENCOUNTER — SLEEP CENTER VISIT (OUTPATIENT)
Dept: SLEEP MEDICINE | Facility: MEDICAL CENTER | Age: 59
End: 2019-01-10
Payer: COMMERCIAL

## 2019-01-10 VITALS
HEART RATE: 62 BPM | HEIGHT: 67 IN | DIASTOLIC BLOOD PRESSURE: 68 MMHG | BODY MASS INDEX: 21.5 KG/M2 | OXYGEN SATURATION: 96 % | WEIGHT: 137 LBS | SYSTOLIC BLOOD PRESSURE: 100 MMHG | RESPIRATION RATE: 14 BRPM

## 2019-01-10 DIAGNOSIS — G47.33 OSA (OBSTRUCTIVE SLEEP APNEA): ICD-10-CM

## 2019-01-10 PROBLEM — R06.83 SNORING: Status: RESOLVED | Noted: 2018-05-09 | Resolved: 2019-01-10

## 2019-01-10 PROBLEM — R53.82 CHRONIC FATIGUE: Status: RESOLVED | Noted: 2018-05-09 | Resolved: 2019-01-10

## 2019-01-10 PROCEDURE — 99213 OFFICE O/P EST LOW 20 MIN: CPT | Performed by: NURSE PRACTITIONER

## 2019-01-10 ASSESSMENT — ENCOUNTER SYMPTOMS
CARDIOVASCULAR NEGATIVE: 1
CONSTITUTIONAL NEGATIVE: 1
EYE PAIN: 0
NEUROLOGICAL NEGATIVE: 1
GASTROINTESTINAL NEGATIVE: 1
BRUISES/BLEEDS EASILY: 0
PSYCHIATRIC NEGATIVE: 1
RESPIRATORY NEGATIVE: 1
MUSCULOSKELETAL NEGATIVE: 1
EYE DISCHARGE: 0

## 2019-01-10 NOTE — PROGRESS NOTES
Chief Complaint   Patient presents with   • Follow-Up     SS results         HPI: This patient is a 58 y.o. male, who presents for home sleep study results.    He was seen in consultation with Dr. Alcaraz October 3, 2018.  Please see his note for details.  Patient reports non-refreshing sleep and daytime sleepiness.  ESS is 14.  Home sleep study shows mild obstructive sleep apnea with an NEELA of 10.7 and a nicole O2 of 86%.    Sleep testing reviewed with the patient today.  He says that since his initial consultation he has made several adjustments to his lifestyle which has significantly improved his sleep.  He is currently using invisaline dental appliance which is correcting overcrowding in his mouth.  He has been exercising more.  He is now adjusted to the altitude moving from [place] to [place].  He feels he is sleeping very well.  He denies EDS or a.m. headache.  His wife notes mild occasional snoring.  Patient has no cardiac or pulmonary history.    Past Medical History:   Diagnosis Date   • Allergy    • Anxiety    • Back pain    • Cataract    • Chickenpox    • Sinusitis        Social History   Substance Use Topics   • Smoking status: Never Smoker   • Smokeless tobacco: Never Used   • Alcohol use Yes      Comment:  1-2 a night        Family History   Problem Relation Age of Onset   • Depression Daughter    • Heart Failure Father    • Stroke Neg Hx        Immunization History   Administered Date(s) Administered   • Hepatitis A Vaccine, Adult 03/26/2012, 10/11/2012   • Influenza Vaccine Quad Inj (Pf) 09/10/2018   • Tdap Vaccine 03/26/2012   • Typhoid Vaccine 03/26/2012       Current medications as of today   Current Outpatient Prescriptions   Medication Sig Dispense Refill   • Multiple Vitamin (MULTI-VITAMIN PO) Take  by mouth.     • ibuprofen (MOTRIN) 200 MG Tab Take 200 mg by mouth every 6 hours as needed.     • finasteride (PROSCAR) 5 MG Tab Take 5 mg by mouth every day.     • MINOXIDIL EX 0.1 mL by Apply  "externally route every day.       No current facility-administered medications for this visit.        Allergies: Patient has no known allergies.    Blood pressure 100/68, pulse 62, resp. rate 14, height 1.702 m (5' 7\"), weight 62.1 kg (137 lb), SpO2 96 %.      Review of Systems   Constitutional: Negative.    HENT: Negative.    Eyes: Negative for pain and discharge.   Respiratory: Negative.    Cardiovascular: Negative.    Gastrointestinal: Negative.    Musculoskeletal: Negative.    Skin: Negative.    Neurological: Negative.    Endo/Heme/Allergies: Negative for environmental allergies. Does not bruise/bleed easily.   Psychiatric/Behavioral: Negative.        Physical Exam   Constitutional: He is oriented to person, place, and time and well-developed, well-nourished, and in no distress.   HENT:   Head: Normocephalic and atraumatic.   Eyes: Pupils are equal, round, and reactive to light.   Neck: Normal range of motion. Neck supple. No tracheal deviation present.   Pulmonary/Chest: Effort normal.   Musculoskeletal: Normal range of motion.   Neurological: He is alert and oriented to person, place, and time. Gait normal.   Skin: Skin is warm and dry.   Psychiatric: Mood, memory, affect and judgment normal.   Vitals reviewed.      Diagnoses/Plan:    1. DONNIE (obstructive sleep apnea)-mild  Reviewed sleep study with the patient.  We discussed treatment options for mild sleep apnea including dental appliance and trial of CPAP.  He is adamant against CPAP.  He is currently using invisaline his teeth.  He is not interested in trying a dental appliance for sleep apnea.  He says since his initial consultation his sleep symptoms have resolved.  He is feeling very well.  He is opting against treatment at this time.  I think this is reasonable.    He will follow-up with his primary care provider.  Should he have return of symptoms including restless sleep, frequent nocturnal awakenings, loud snoring, EDS, a.m. headache he will return " for reevaluation.          This dictation was created using voice recognition software. The accuracy of the dictation is limited to the abilities of the software. I expect there may be some errors of grammar and possibly content.

## 2019-01-10 NOTE — LETTER
SAFIA Mendosa  [facility]   [address] [place] [place] [address]  Phone: [phone] - Fax: [phone]           Encounter Date: 1/10/2019  Provider: SAFIA Mendosa  Location of Care: [facility]      Patient:   Venkata Funes   MR Number: [id number]   YOB: 1960     PROGRESS NOTE:  Chief Complaint   Patient presents with   • Follow-Up     SS results         HPI: This patient is a 58 y.o. male, who presents for home sleep study results.    He was seen in consultation with Dr. Alcaraz October 3, 2018.  Please see his note for details.  Patient reports non-refreshing sleep and daytime sleepiness.  ESS is 14.  Home sleep study shows mild obstructive sleep apnea with an NEELA of 10.7 and a nicole O2 of 86%.    Sleep testing reviewed with the patient today.  He says that since his initial consultation he has made several adjustments to his lifestyle which has significantly improved his sleep.  He is currently using invisaline dental appliance which is correcting overcrowding in his mouth.  He has been exercising more.  He is now adjusted to the altitude moving from [place] to [place].  He feels he is sleeping very well.  He denies EDS or a.m. headache.  His wife notes mild occasional snoring.  Patient has no cardiac or pulmonary history.    Past Medical History:   Diagnosis Date   • Allergy    • Anxiety    • Back pain    • Cataract    • Chickenpox    • Sinusitis        Social History   Substance Use Topics   • Smoking status: Never Smoker   • Smokeless tobacco: Never Used   • Alcohol use Yes      Comment:  1-2 a night        Family History   Problem Relation Age of Onset   • Depression Daughter    • Heart Failure Father    • Stroke Neg Hx        Immunization History   Administered Date(s) Administered   • Hepatitis A Vaccine, Adult 03/26/2012, 10/11/2012   • Influenza Vaccine Quad Inj (Pf) 09/10/2018   •  "Tdap Vaccine 03/26/2012   • Typhoid Vaccine 03/26/2012       Current medications as of today   Current Outpatient Prescriptions   Medication Sig Dispense Refill   • Multiple Vitamin (MULTI-VITAMIN PO) Take  by mouth.     • ibuprofen (MOTRIN) 200 MG Tab Take 200 mg by mouth every 6 hours as needed.     • finasteride (PROSCAR) 5 MG Tab Take 5 mg by mouth every day.     • MINOXIDIL EX 0.1 mL by Apply externally route every day.       No current facility-administered medications for this visit.        Allergies: Patient has no known allergies.    Blood pressure 100/68, pulse 62, resp. rate 14, height 1.702 m (5' 7\"), weight 62.1 kg (137 lb), SpO2 96 %.      Review of Systems   Constitutional: Negative.    HENT: Negative.    Eyes: Negative for pain and discharge.   Respiratory: Negative.    Cardiovascular: Negative.    Gastrointestinal: Negative.    Musculoskeletal: Negative.    Skin: Negative.    Neurological: Negative.    Endo/Heme/Allergies: Negative for environmental allergies. Does not bruise/bleed easily.   Psychiatric/Behavioral: Negative.        Physical Exam   Constitutional: He is oriented to person, place, and time and well-developed, well-nourished, and in no distress.   HENT:   Head: Normocephalic and atraumatic.   Eyes: Pupils are equal, round, and reactive to light.   Neck: Normal range of motion. Neck supple. No tracheal deviation present.   Pulmonary/Chest: Effort normal.   Musculoskeletal: Normal range of motion.   Neurological: He is alert and oriented to person, place, and time. Gait normal.   Skin: Skin is warm and dry.   Psychiatric: Mood, memory, affect and judgment normal.   Vitals reviewed.      Diagnoses/Plan:    1. DONNIE (obstructive sleep apnea)-mild  Reviewed sleep study with the patient.  We discussed treatment options for mild sleep apnea including dental appliance and trial of CPAP.  He is adamant against CPAP.  He is currently using invisaline his teeth.  He is not interested in trying a " dental appliance for sleep apnea.  He says since his initial consultation his sleep symptoms have resolved.  He is feeling very well.  He is opting against treatment at this time.  I think this is reasonable.    He will follow-up with his primary care provider.  Should he have return of symptoms including restless sleep, frequent nocturnal awakenings, loud snoring, EDS, a.m. headache he will return for reevaluation.          This dictation was created using voice recognition software. The accuracy of the dictation is limited to the abilities of the software. I expect there may be some errors of grammar and possibly content.         Electronically signed by SAFIA Mendosa  on 01/10/19    Kunal Quach M.D.  [id number]  [address]  [place] [address]  VIA In Basket

## 2019-01-14 ENCOUNTER — OFFICE VISIT (OUTPATIENT)
Dept: MEDICAL GROUP | Facility: MEDICAL CENTER | Age: 59
End: 2019-01-14
Payer: COMMERCIAL

## 2019-01-14 VITALS
HEART RATE: 54 BPM | WEIGHT: 134 LBS | HEIGHT: 67 IN | OXYGEN SATURATION: 100 % | DIASTOLIC BLOOD PRESSURE: 62 MMHG | BODY MASS INDEX: 21.03 KG/M2 | TEMPERATURE: 97.2 F | SYSTOLIC BLOOD PRESSURE: 112 MMHG | RESPIRATION RATE: 16 BRPM

## 2019-01-14 DIAGNOSIS — Z00.00 HEALTHCARE MAINTENANCE: ICD-10-CM

## 2019-01-14 DIAGNOSIS — G89.29 CHRONIC RIGHT SHOULDER PAIN: ICD-10-CM

## 2019-01-14 DIAGNOSIS — M25.511 CHRONIC RIGHT SHOULDER PAIN: ICD-10-CM

## 2019-01-14 DIAGNOSIS — E78.5 DYSLIPIDEMIA: ICD-10-CM

## 2019-01-14 PROCEDURE — 99213 OFFICE O/P EST LOW 20 MIN: CPT | Performed by: FAMILY MEDICINE

## 2019-01-14 ASSESSMENT — PATIENT HEALTH QUESTIONNAIRE - PHQ9: CLINICAL INTERPRETATION OF PHQ2 SCORE: 0

## 2019-01-14 NOTE — PROGRESS NOTES
[facility]  Progress Note  Established Patient    Subjective:   Venkata Funes is a 58 y.o. male here today with a chief complaint of shoulder pain and here for a wellness exam. The patient is alone.     Healthcare maintenance  Lipids: done 2018 with 10 year ASCVD risk 4.66%. No indication for ASA or statin.   Fasting Glucose: done 2018 and normal.    Hepatitis C Screen: done 2018 and normal.   Colonoscopy: done 11/16/17 with 5 year recall.     Flu vaccine: given 2018.   Tdap: given 2012.     Chronic right shoulder pain  Patient has a several year history of right-sided shoulder pain.  He states the pain is located anteriorly and associated with some clicking at times.  He also has some posterior muscular pain that seems to respond to massage therapy.  He used to play tennis a lot and wonders if this could have exacerbated the shoulder.  There is no acute trauma.    Dyslipidemia  The patient has slight dyslipidemia with a 10 year ASCVD risk of 4.66%. He tries to stay active.      Current Outpatient Prescriptions on File Prior to Visit   Medication Sig Dispense Refill   • Multiple Vitamin (MULTI-VITAMIN PO) Take  by mouth.     • ibuprofen (MOTRIN) 200 MG Tab Take 200 mg by mouth every 6 hours as needed.     • finasteride (PROSCAR) 5 MG Tab Take 5 mg by mouth every day.     • MINOXIDIL EX 0.1 mL by Apply externally route every day.       No current facility-administered medications on file prior to visit.        Past Medical History:   Diagnosis Date   • Allergy    • Anxiety    • Back pain    • Cataract    • Chickenpox    • Sinusitis        Allergies: Patient has no known allergies.    Surgical History:  has a past surgical history that includes cataract extraction with iol; nasal septal reconstruction; eye surgery; and vasectomy.    Family History: family history includes Depression in his daughter; Heart Failure in his father.    Social History:  reports that he has never smoked. He has never used smokeless  "tobacco. He reports that he drinks alcohol. He reports that he does not use drugs.    ROS: no fever or nausea.        Objective:     Vitals:    01/14/19 1000   BP: 112/62   BP Location: Right arm   Patient Position: Sitting   BP Cuff Size: Adult   Pulse: (!) 54   Resp: 16   Temp: 36.2 °C (97.2 °F)   TempSrc: Temporal   SpO2: 100%   Weight: 60.8 kg (134 lb)   Height: 1.702 m (5' 7\")       Physical Exam:  General: alert in no apparent distress.   Affect: normal.   Gait: Normal.  MSK: No tenderness to palpation over the right shoulder.  There is no weakness or pain on right shoulder internal rotation, external rotation or empty can testing.        Assessment and Plan:     1. Healthcare maintenance  - see HPI.   - discussed diet and exercise, Mediterranean Diet discussed.   - BASIC METABOLIC PANEL; Future  - Lipid Profile; Future  - PROSTATE SPECIFIC AG SCREENING; Future    2. Chronic right shoulder pain  Will get XR and send to PT if patient would like.   - DX-SHOULDER 2+ RIGHT; Future  - REFERRAL TO PHYSICAL THERAPY Reason for Therapy: Eval/Treat/Report    3. Dyslipidemia  - continue diet and exercise.         Followup: Return in about 1 year (around 1/14/2020), or if symptoms worsen or fail to improve.         "

## 2019-01-14 NOTE — ASSESSMENT & PLAN NOTE
Lipids: done 2018 with 10 year ASCVD risk 4.66%. No indication for ASA or statin.   Fasting Glucose: done 2018 and normal.    Hepatitis C Screen: done 2018 and normal.   Colonoscopy: done 11/16/17 with 5 year recall.     Flu vaccine: given 2018.   Tdap: given 2012.

## 2019-01-14 NOTE — ASSESSMENT & PLAN NOTE
Patient has a several year history of right-sided shoulder pain.  He states the pain is located anteriorly and associated with some clicking at times.  He also has some posterior muscular pain that seems to respond to massage therapy.  He used to play tennis a lot and wonders if this could have exacerbated the shoulder.  There is no acute trauma.

## 2019-01-22 ENCOUNTER — APPOINTMENT (OUTPATIENT)
Dept: PHYSICAL THERAPY | Facility: REHABILITATION | Age: 59
End: 2019-01-22
Attending: FAMILY MEDICINE
Payer: COMMERCIAL

## 2019-02-05 ENCOUNTER — PHYSICAL THERAPY (OUTPATIENT)
Dept: PHYSICAL THERAPY | Facility: REHABILITATION | Age: 59
End: 2019-02-05
Attending: FAMILY MEDICINE
Payer: COMMERCIAL

## 2019-02-05 DIAGNOSIS — G89.29 CHRONIC RIGHT SHOULDER PAIN: ICD-10-CM

## 2019-02-05 DIAGNOSIS — M25.511 CHRONIC RIGHT SHOULDER PAIN: ICD-10-CM

## 2019-02-05 PROCEDURE — 97110 THERAPEUTIC EXERCISES: CPT

## 2019-02-05 PROCEDURE — 97162 PT EVAL MOD COMPLEX 30 MIN: CPT

## 2019-02-05 ASSESSMENT — ENCOUNTER SYMPTOMS
PAIN SCALE AT HIGHEST: 8
EXACERBATED BY: CARRYING
ADDITIONAL INFORMATION: VARIABLE
ALLEVIATING FACTORS: REST
PAIN SCALE: 6
PAIN TIMING: INTERMITTENT
EXACERBATED BY: HOUSEWORK
EXACERBATED BY: OVERHEAD ACTIVITY
QUALITY: TIGHTNESS
EXACERBATED BY: LIFTING
PAIN SCALE AT LOWEST: 0
QUALITY: BURNING
EXACERBATED BY: KEYBOARDING
ALLEVIATING FACTORS: MASSAGE

## 2019-02-05 NOTE — OP THERAPY EVALUATION
Outpatient Physical Therapy  INITIAL EVALUATION    [address]  [address]  [place] [address]  Phone:  [phone]  Fax:  [phone]    Date of Evaluation: 2019    Patient: Venkata Funes  YOB: 1960  MRN: [id number]     Referring Provider: Kunal Quach M.D.  [address] [facility]  [address]  [place] [address]   Referring Diagnosis Chronic right shoulder pain [M25.511, G89.29]     Time Calculation             Physical Therapy Occurrence Codes    Date physical therapy care plan established or reviewed:  19   Date physical therapy treatment started:  19          Chief Complaint: No chief complaint on file.    Visit Diagnoses     ICD-10-CM   1. Chronic right shoulder pain M25.511    G89.29         Subjective:   History of Present Illness:     Mechanism of injury:  Chronic Right anterior shoulder pain x several years, cervical tightness and posterior scapular pain/tightness, last episode increased N/T down right arm, weakness right UE, . + dizziness tying shoes, a few fainting episodes several years ago.  Sleeps with arm under pillow, on stomach trying to sleep in better position to avoid pain sleeping on right side.       Lumbar disc herniations  History of sports overuse bilateral shoulders,  tennis injury serving-progressive shoulder pain, N/T in hand, house projects using tools overhead  Prior level of function:  Exercises regulary, retired, does house projects  Sleep disturbance:  Interrupted sleep  Pain:     Current pain ratin    At best pain ratin    At worst pain ratin    Location:  Right anterior shoulder, posterior scapula, cervical    Quality:  Tightness and burning    Pain timing:  Intermittent    Relieving factors:  Massage and rest    Aggravating factors:  Keyboarding, housework, carrying, overhead activity and lifting (computer work)    Progression:  Stable    Pain Comments::  Variable  Hand dominance:   Right  Diagnostic Tests:     None    Treatments:     Previous treatment:  Massage and chiropractic (temporary help)    Current treatment:  Massage  Activities of Daily Living:     Patient reported ADL status: Pain with brushing teeth, using computer  Patient Goals:     Patient goals for therapy:  Return to sport/leisure activities and decreased pain    Other patient goals:  Play hockey, tennis      Past Medical History:   Diagnosis Date   • Allergy    • Anxiety    • Back pain    • Cataract    • Chickenpox    • Sinusitis      Past Surgical History:   Procedure Laterality Date   • CATARACT EXTRACTION WITH IOL     • EYE SURGERY     • NASAL SEPTAL RECONSTRUCTION     • VASECTOMY       Social History   Substance Use Topics   • Smoking status: Never Smoker   • Smokeless tobacco: Never Used   • Alcohol use Yes      Comment:  1-2 a night      Family and Occupational History     Social History   • Marital status:      Spouse name: N/A   • Number of children: N/A   • Years of education: N/A       Objective     Postural Observations  Seated posture: fair  Correction of posture: has no consistent effect        Cervical Screen    Cervical range of motion within normal limits  Cervical range of motion within normal limits with the following exceptions: tightness    Palpation     Right   Tenderness of the subscapularis and supraspinatus.     Tenderness     Right Shoulder  Tenderness in the bicipital groove and supraspinatus tendon.     Active Range of Motion   Left Shoulder   Normal active range of motion    Right Shoulder   Normal active range of motion    Additional Active Range of Motion Details  EROM with shoulder flexion, abduction decreased/better  With repeated motion  Ext/IR,add:  Increase/no worse    Passive Range of Motion   Left Shoulder   Normal passive range of motion    Right Shoulder   Normal passive range of motion    Joint Play     Additional joint play details:  WNL    Additional Joint Play  Details  WNL    Strength:      Left Shoulder   Normal muscle strength    Right Shoulder   Normal muscle strength    Additional Strength Details   strength:  L: 45,42,42  R: 41, 41, 41lbs    Tests     Right Shoulder   Positive empty can.   Negative Neer's.     Additional Tests Details  Pain but strong empty can         Therapeutic Exercises (CPT 27189):     1. Cervical retraction seated, 2 x 10    2. Repeated thoracic extentsion with chair back, x 10    3. Scaption with pink TBand standing at wall, x 30      Time-based treatments/modalities:          Assessment, Response and Plan:   Assessment details:  Patient presents with probable contractile dysfunction of  Supraspinatus and posture dyfunction which is limiting his ability to lift, carry and perform heavy household chores overhead and participate in hockey and tennis.  Patient has an excellent rehab prognosis.  Recommend continued PT to meet goals stated below and optimize function.  Barriers to therapy:  None  Prognosis: good    Goals:   Short Term Goals:   Patient able to perform ADLS and household chores with > 50% decreased symptoms  Full AROM right GH joint painfree >75% of the time  Short term goal time span:  2-4 weeks      Long Term Goals:    Independent with home exercise program  Return to tennis and/or hockey with >75% decreased symptoms.  Long term goal time span:  6-8 weeks    Plan:   Therapy options:  Physical therapy treatment to continue  Planned therapy interventions:  E Stim Unattended (CPT 75629), Therapeutic Exercise (CPT 39541), Neuromuscular Re-education (CPT 10612) and Manual Therapy (CPT 43330)  Frequency:  2x week  Duration in weeks:  8  Duration in visits:  12  Discussed with:  Patient      Functional Limitations and Severity Modifiers  Quickdash General Total Score: 36.36   Current:     Goal:       Referring provider co-signature:  I have reviewed this plan of care and my co-signature certifies the need for services.  Certification  Dates:   From 2/5/2019    To 4/2/2019    Physician Signature: ________________________________ Date: ______________

## 2019-02-07 ENCOUNTER — APPOINTMENT (OUTPATIENT)
Dept: PHYSICAL THERAPY | Facility: REHABILITATION | Age: 59
End: 2019-02-07
Attending: FAMILY MEDICINE
Payer: COMMERCIAL

## 2019-02-12 ENCOUNTER — PHYSICAL THERAPY (OUTPATIENT)
Dept: PHYSICAL THERAPY | Facility: REHABILITATION | Age: 59
End: 2019-02-12
Attending: FAMILY MEDICINE
Payer: COMMERCIAL

## 2019-02-12 DIAGNOSIS — M25.511 CHRONIC RIGHT SHOULDER PAIN: ICD-10-CM

## 2019-02-12 DIAGNOSIS — G89.29 CHRONIC RIGHT SHOULDER PAIN: ICD-10-CM

## 2019-02-12 PROCEDURE — 97014 ELECTRIC STIMULATION THERAPY: CPT

## 2019-02-12 PROCEDURE — 97110 THERAPEUTIC EXERCISES: CPT

## 2019-02-12 PROCEDURE — 97140 MANUAL THERAPY 1/> REGIONS: CPT

## 2019-02-12 NOTE — OP THERAPY DAILY TREATMENT
Outpatient Physical Therapy  DAILY TREATMENT     [facility] Physical [address]  [address]  [place] [address]  Phone:  [phone]  Fax:  [phone]    Date: 02/12/2019    Patient: Venktaa Funes  YOB: 1960  MRN: [id number]     Time Calculation  Start time: 1100  Stop time: 1345 Time Calculation (min): 165 minutes     Chief Complaint: No chief complaint on file.    Visit #: 2    SUBJECTIVE:  Better, pain decreases with more activity using right arm    OBJECTIVE:  Current objective measures:           Therapeutic Exercises (CPT 44250):     1. Thoracic extension over chair back, x 20    2. Scaption with pink TBand standing at wall, x 20    3. [place] with pink Tband, 2 x 30 sec    4. Posture re ed seated    Therapeutic Treatments and Modalities:     1. Manual Therapy (CPT 84807), IASTM right supraspinatus, infraspinatus    2. E Stim Unattended (CPT 56858), Russian stim right supraspinatus, infraspinatus with ball roll on wall SL x 15 min    Time-based treatments/modalities:  Manual therapy minutes (CPT 70555): 10 minutes  Therapeutic exercise minutes (CPT 99319): 20 minutes       ASSESSMENT:   Response to treatment: Decrease/Better with repeated scaption.  Patient demonstrates excessive scap protraction and decreased thoracic extension.  Patient is interested and receptive to IDN next session.      PLAN/RECOMMENDATIONS:   Plan for treatment: therapy treatment to continue next visit.  Planned interventions for next visit: continue with current treatment.

## 2019-02-14 ENCOUNTER — PHYSICAL THERAPY (OUTPATIENT)
Dept: PHYSICAL THERAPY | Facility: REHABILITATION | Age: 59
End: 2019-02-14
Attending: FAMILY MEDICINE
Payer: COMMERCIAL

## 2019-02-14 DIAGNOSIS — M25.511 CHRONIC RIGHT SHOULDER PAIN: ICD-10-CM

## 2019-02-14 DIAGNOSIS — G89.29 CHRONIC RIGHT SHOULDER PAIN: ICD-10-CM

## 2019-02-14 PROCEDURE — 97110 THERAPEUTIC EXERCISES: CPT

## 2019-02-14 PROCEDURE — 97140 MANUAL THERAPY 1/> REGIONS: CPT

## 2019-02-14 NOTE — OP THERAPY DAILY TREATMENT
Outpatient Physical Therapy  DAILY TREATMENT     [facility] Physical [address]  [address]  [place] [address]  Phone:  [phone]  Fax:  [phone]    Date: 02/14/2019    Patient: Venkata Funes  YOB: 1960  MRN: [id number]     Time Calculation  Start time: 1430  Stop time: 1515 Time Calculation (min): 45 minutes     Chief Complaint: No chief complaint on file.    Visit #: 3    SUBJECTIVE:  Shoveled snow yesterday-increased shoulder pain    OBJECTIVE:  Current objective measures:           Therapeutic Exercises (CPT 85464):     1. Thoracic extension over chair back, x 20    2. Scaption with pink TBand standing at wall, x 20    3. [place] with pink Tband, 2 x 30 sec    4. Posture re ed seated    5. Serratus press with roller on wall, x 20    6. Foam roller alt shoulder flex, horizontal abduction, pec stretch, x 10 each    Therapeutic Treatments and Modalities:     1. Manual Therapy (CPT 61882), IDN as stated below    2. E Stim Unattended (CPT 31908)    Therapeutic Treatment and Modalities Summary: IDN : Informed consent obtained and documented.  Skin prepped with alcohol wipe.  Patient positioned prone.  Suprascapular homeostatic point, symptomatic right supraspinatus and teres major .  30mm needles x 5 min followed by MHP over posterior scapula.      Time-based treatments/modalities:  Manual therapy minutes (CPT 68706): 20 minutes  Therapeutic exercise minutes (CPT 89514): 25 minutes       Pain rating before treatment: 1  Pain rating after treatment: 1    ASSESSMENT:   Response to treatment: hypertrophy right teres minor, teres major with tenderness.     PLAN/RECOMMENDATIONS:   Plan for treatment: therapy treatment to continue next visit.  Planned interventions for next visit: continue with current treatment.

## 2019-02-21 ENCOUNTER — PHYSICAL THERAPY (OUTPATIENT)
Dept: PHYSICAL THERAPY | Facility: REHABILITATION | Age: 59
End: 2019-02-21
Attending: FAMILY MEDICINE
Payer: COMMERCIAL

## 2019-02-21 DIAGNOSIS — G89.29 CHRONIC RIGHT SHOULDER PAIN: ICD-10-CM

## 2019-02-21 DIAGNOSIS — M25.511 CHRONIC RIGHT SHOULDER PAIN: ICD-10-CM

## 2019-02-21 PROCEDURE — 97140 MANUAL THERAPY 1/> REGIONS: CPT

## 2019-02-21 PROCEDURE — 97014 ELECTRIC STIMULATION THERAPY: CPT

## 2019-02-21 NOTE — OP THERAPY DAILY TREATMENT
Outpatient Physical Therapy  DAILY TREATMENT     [facility] Physical [address].  [address]  [place] [address]  Phone:  [phone]  Fax:  [phone]    Date: 02/21/2019    Patient: Venkata Funes  YOB: 1960  MRN: [id number]     Time Calculation  Start time: 1315  Stop time: 1345 Time Calculation (min): 30 minutes     Chief Complaint: Shoulder Problem    Visit #: 4    SUBJECTIVE:  Had some bruising after dry needling but otherwise it felt OK. Feel like I just need to break up adhesions in the muscles so I work on them constantly and I've started to try some beginner yoga.     OBJECTIVE:          Therapeutic Exercises (CPT 45137):       Therapeutic Exercise Summary: Pt 15 min late to appointment, unable to complete there ex during session, but pt states he feels good with his exercises at home    Therapeutic Treatments and Modalities:     1. Manual Therapy (CPT 88900), IASTM to R supra, infra spinatus, upper trap, rhomboids, lower trap, x15 min    2. E Stim Unattended (CPT 73719), Russian stim right supraspinatus, infraspinatus with ball roll on mat SL x 15 min    Therapeutic Treatment and Modalities Summary: Reviewed use of TENS unit at home and had pt stop excessive gross muscle contraction of R shoulder.       Time-based treatments/modalities:  Manual therapy minutes (CPT 41385): 15 minutes           ASSESSMENT:   Response to treatment: Rope-like tension in rhomboids and supra/infraspinatus on R side. Pt reported some improvement in overall pain and tightness in R shoulder after manual and e-stim today.     PLAN/RECOMMENDATIONS:   Plan for treatment: therapy treatment to continue next visit.  Planned interventions for next visit: continue with current treatment.

## 2019-02-25 ENCOUNTER — APPOINTMENT (OUTPATIENT)
Dept: PHYSICAL THERAPY | Facility: REHABILITATION | Age: 59
End: 2019-02-25
Attending: FAMILY MEDICINE
Payer: COMMERCIAL

## 2019-02-28 ENCOUNTER — PHYSICAL THERAPY (OUTPATIENT)
Dept: PHYSICAL THERAPY | Facility: REHABILITATION | Age: 59
End: 2019-02-28
Attending: FAMILY MEDICINE
Payer: COMMERCIAL

## 2019-02-28 DIAGNOSIS — G89.29 CHRONIC RIGHT SHOULDER PAIN: ICD-10-CM

## 2019-02-28 DIAGNOSIS — M25.511 CHRONIC RIGHT SHOULDER PAIN: ICD-10-CM

## 2019-02-28 PROCEDURE — 97140 MANUAL THERAPY 1/> REGIONS: CPT

## 2019-02-28 PROCEDURE — 97014 ELECTRIC STIMULATION THERAPY: CPT

## 2019-02-28 PROCEDURE — 97110 THERAPEUTIC EXERCISES: CPT

## 2019-02-28 NOTE — OP THERAPY DAILY TREATMENT
Outpatient Physical Therapy  DAILY TREATMENT     [facility] Physical [address].  [address]  [place] [address]  Phone:  [phone]  Fax:  [phone]    Date: 02/28/2019    Patient: Venkata Funes  YOB: 1960  MRN: [id number]     Time Calculation  Start time: 1130  Stop time: 1216 Time Calculation (min): 46 minutes     Chief Complaint: No chief complaint on file.    Visit #: 5    SUBJECTIVE: shoulder feels tight but slow decreased pain, been doing home exercise program daily    OBJECTIVE:  Current objective measures:           Therapeutic Exercises (CPT 47957):     1. Foam roller alt shoulder flexion, horizontal abduction pec stretch, 5 min    2. Repeated EROM flexion with wand in standing, x 15    3. Low trap box exercise on roller pink tband, x 15    4. Scaption with pin tband against wall, reviewed    Therapeutic Treatments and Modalities:     1. E Stim Unattended (CPT 79986), [place] supra/infraspinatus ball roll 10 /10 with mhp x 15 min    2. Manual Therapy (CPT 74185), IASTM right infraspinatus, supraspinatus, teres major, inferior/posterior right GH joint glides with flexion-scoop mob    Time-based treatments/modalities:  Manual therapy minutes (CPT 95648): 11 minutes  Therapeutic exercise minutes (CPT 67655): 19 minutes         ASSESSMENT:   Response to treatment: articular dysfunction resolves with joint mob and repeated flexion to erom.  0/10 VAS post treatment    PLAN/RECOMMENDATIONS:   Plan for treatment: therapy treatment to continue next visit.  Planned interventions for next visit: continue with current treatment.

## 2019-03-01 ENCOUNTER — HOSPITAL ENCOUNTER (OUTPATIENT)
Dept: RADIOLOGY | Facility: MEDICAL CENTER | Age: 59
End: 2019-03-01
Attending: FAMILY MEDICINE
Payer: COMMERCIAL

## 2019-03-01 DIAGNOSIS — M25.511 CHRONIC RIGHT SHOULDER PAIN: ICD-10-CM

## 2019-03-01 DIAGNOSIS — G89.29 CHRONIC RIGHT SHOULDER PAIN: ICD-10-CM

## 2019-03-01 PROCEDURE — 73030 X-RAY EXAM OF SHOULDER: CPT | Mod: RT

## 2019-03-05 ENCOUNTER — PHYSICAL THERAPY (OUTPATIENT)
Dept: PHYSICAL THERAPY | Facility: REHABILITATION | Age: 59
End: 2019-03-05
Attending: FAMILY MEDICINE
Payer: COMMERCIAL

## 2019-03-05 DIAGNOSIS — M25.511 CHRONIC RIGHT SHOULDER PAIN: ICD-10-CM

## 2019-03-05 DIAGNOSIS — G89.29 CHRONIC RIGHT SHOULDER PAIN: ICD-10-CM

## 2019-03-05 PROCEDURE — 97140 MANUAL THERAPY 1/> REGIONS: CPT

## 2019-03-05 PROCEDURE — 97014 ELECTRIC STIMULATION THERAPY: CPT

## 2019-03-05 PROCEDURE — 97110 THERAPEUTIC EXERCISES: CPT

## 2019-03-05 NOTE — OP THERAPY DAILY TREATMENT
Outpatient Physical Therapy  DAILY TREATMENT     [facility] Physical Therapy [address]  [address]  [place] [address]  Phone:  [phone]  Fax:  [phone]    Date: 03/05/2019    Patient: Venkata Funes  YOB: 1960  MRN: [id number]     Time Calculation  Start time: 1400  Stop time: 1500 Time Calculation (min): 60 minutes     Chief Complaint: No chief complaint on file.    Visit #: 6    SUBJECTIVE:Better, able to wisk /bake scones without symptoms, sleeping better but still painful      OBJECTIVE:  Current objective measures:           Therapeutic Exercises (CPT 06796):     1. Foam roller alt shoulder flexion, horizontal abduction pec stretch, 5 min    2. Repeated EROM flexion with wand in standing, x 15, reviewed    3. Low trap box exercise on roller pink tband, x 15    4. Scaption with green tband against wall, 2 x 15    5. Roller serratus on wall, x 20    6. 90/90 walk back  iso ER with pink tbad, x 10    7. Posterior capsule stretch, x 10 reps    Therapeutic Treatments and Modalities:     1. E Stim Unattended (CPT 91845), [place] supra/infraspinatus ball roll 10 /10 with mhp x 15 min    2. Manual Therapy (CPT 35706), IASTM right infraspinatus, supraspinatus, teres major, inferior/posterior right GH joint glides with flexion-scoop mob    Time-based treatments/modalities:  Manual therapy minutes (CPT 10844): 15 minutes  Therapeutic exercise minutes (CPT 36844): 25 minutes     ASSESSMENT:   Response to treatment: tenderness right infraspinatus but symptom free except for posterior capsul tightness. Progressing well.  Right shoulder remains anterior/protractied compared with left    PLAN/RECOMMENDATIONS:   Plan for treatment: therapy treatment to continue next visit.  Planned interventions for next visit: continue with current treatment.

## 2019-03-12 ENCOUNTER — PHYSICAL THERAPY (OUTPATIENT)
Dept: PHYSICAL THERAPY | Facility: REHABILITATION | Age: 59
End: 2019-03-12
Attending: FAMILY MEDICINE
Payer: COMMERCIAL

## 2019-03-12 DIAGNOSIS — G89.29 CHRONIC RIGHT SHOULDER PAIN: ICD-10-CM

## 2019-03-12 DIAGNOSIS — M25.511 CHRONIC RIGHT SHOULDER PAIN: ICD-10-CM

## 2019-03-12 PROCEDURE — 97140 MANUAL THERAPY 1/> REGIONS: CPT

## 2019-03-12 PROCEDURE — 97014 ELECTRIC STIMULATION THERAPY: CPT

## 2019-03-12 PROCEDURE — 97110 THERAPEUTIC EXERCISES: CPT

## 2019-03-12 NOTE — OP THERAPY DAILY TREATMENT
Outpatient Physical Therapy  DAILY TREATMENT     [facility] Physical [address]  [address]  [place] [address]  Phone:  [phone]  Fax:  [phone]    Date: 03/12/2019    Patient: Venkata Funes  YOB: 1960  MRN: [id number]     Time Calculation  Start time: 0800  Stop time: 0845 Time Calculation (min): 45 minutes     Chief Complaint: No chief complaint on file.    Visit #: 7    SUBJECTIVE:  Same from last visit, walking back isometric     OBJECTIVE:  Current objective measures:           Therapeutic Exercises (CPT 60653):     1. Foam roller alt shoulder flexion, horizontal abduction pec stretch, 5 min, reviewed    2. Repeated EROM flexion with wand in standing, x 15, reviewed    3. Low trap box exercise on supine pink tband, x 15    4. Scaption with green tband against wall, 2 x 15, reviewed    5. Roller serratus on wall, x 20, reviewed    6. 90/90 walk back  iso ER with pink tbad, x 10    7. Posterior capsule stretch, x 10 reps    8. Er with pink tband arm in neutral, x 30    9. Supine horizontal abduction with 2lb weight, x 30    Therapeutic Treatments and Modalities:     1. E Stim Unattended (CPT 94010), [place] supra/infraspinatus ball roll 10 /10 with mhp x 15 min    2. Manual Therapy (CPT 56314), IASTM right infraspinatus, supraspinatus, teres major, inferior/posterior right GH joint glides with flexion-scoop mob    Time-based treatments/modalities:  Manual therapy minutes (CPT 41616): 10 minutes  Therapeutic exercise minutes (CPT 88370): 20 minutes         ASSESSMENT:   Response to treatment: asymptomatic post treatment, multiple trigger points and weakness right infraspinatus.      PLAN/RECOMMENDATIONS:   Plan for treatment: therapy treatment to continue next visit.  Planned interventions for next visit: continue with current treatment.

## 2019-03-19 ENCOUNTER — PHYSICAL THERAPY (OUTPATIENT)
Dept: PHYSICAL THERAPY | Facility: REHABILITATION | Age: 59
End: 2019-03-19
Attending: FAMILY MEDICINE
Payer: COMMERCIAL

## 2019-03-19 DIAGNOSIS — M25.511 CHRONIC RIGHT SHOULDER PAIN: ICD-10-CM

## 2019-03-19 DIAGNOSIS — G89.29 CHRONIC RIGHT SHOULDER PAIN: ICD-10-CM

## 2019-03-19 PROCEDURE — 97112 NEUROMUSCULAR REEDUCATION: CPT

## 2019-03-19 PROCEDURE — 97140 MANUAL THERAPY 1/> REGIONS: CPT

## 2019-03-19 PROCEDURE — 97014 ELECTRIC STIMULATION THERAPY: CPT

## 2019-03-19 NOTE — OP THERAPY DAILY TREATMENT
Outpatient Physical Therapy  DAILY TREATMENT     [facility] Physical [address].  [address]  [place] [address]  Phone:  [phone]  Fax:  [phone]    Date: 03/19/2019    Patient: Venkata Funes  YOB: 1960  MRN: [id number]     Time Calculation  Start time: 1030  Stop time: 1115 Time Calculation (min): 45 minutes     Chief Complaint: No chief complaint on file.    Visit #: 8    SUBJECTIVE: significantly better, no symptoms today      OBJECTIVE:  Current objective measures:           Therapeutic Exercises (CPT 94621):     1. Foam roller alt shoulder flexion, horizontal abduction pec stretch, 5 min, reviewed    2. Repeated EROM flexion with wand in standing, x 15, reviewed    3. Low trap box exercise on supine pink tband, x 15    4. Scaption with green tband against wall, 2 x 15, reviewed    5. Roller serratus on wall, x 20    6. 90/90 walk back  iso ER with pink tbad, x 10    7. Posterior capsule stretch, x 10 reps    8. Er with pink tband arm in neutral, x 30    9. Prone horizontal abduction with 2lb weight, x 30    10. Prone ys, x 15    Therapeutic Treatments and Modalities:     1. E Stim Unattended (CPT 45225), [place] supra/infraspinatus ball roll 10 /10 with mhp x 15 min    2. Manual Therapy (CPT 34504), IASTM right infraspinatus, supraspinatus, teres major, inferior/posterior right GH joint glides with flexion-scoop mob    Time-based treatments/modalities:  Manual therapy minutes (CPT 99720): 10 minutes  Neuromusc re-ed, balance, coor, post minutes (CPT 72424): 20 minutes       ASSESSMENT:   Response to treatment: decreased tenderness and hypertonicity right infraspinatus, patient progressing well with progressive loading of infraspinatus.  Asymptomatic throughout treatment.    PLAN/RECOMMENDATIONS:   Plan for treatment: therapy treatment to continue next visit. UPOC  Planned interventions for next visit: continue with current treatment.

## 2019-03-21 ENCOUNTER — APPOINTMENT (OUTPATIENT)
Dept: PHYSICAL THERAPY | Facility: REHABILITATION | Age: 59
End: 2019-03-21
Attending: FAMILY MEDICINE
Payer: COMMERCIAL

## 2019-03-21 ENCOUNTER — OFFICE VISIT (OUTPATIENT)
Dept: MEDICAL GROUP | Facility: MEDICAL CENTER | Age: 59
End: 2019-03-21
Payer: COMMERCIAL

## 2019-03-21 VITALS
OXYGEN SATURATION: 98 % | HEIGHT: 67 IN | HEART RATE: 50 BPM | RESPIRATION RATE: 18 BRPM | WEIGHT: 130 LBS | SYSTOLIC BLOOD PRESSURE: 112 MMHG | BODY MASS INDEX: 20.4 KG/M2 | TEMPERATURE: 97.1 F | DIASTOLIC BLOOD PRESSURE: 64 MMHG

## 2019-03-21 DIAGNOSIS — R42 POSTURAL DIZZINESS WITH PRESYNCOPE: ICD-10-CM

## 2019-03-21 DIAGNOSIS — L25.9 CONTACT DERMATITIS, UNSPECIFIED CONTACT DERMATITIS TYPE, UNSPECIFIED TRIGGER: ICD-10-CM

## 2019-03-21 DIAGNOSIS — R55 POSTURAL DIZZINESS WITH PRESYNCOPE: ICD-10-CM

## 2019-03-21 PROCEDURE — 99214 OFFICE O/P EST MOD 30 MIN: CPT | Performed by: FAMILY MEDICINE

## 2019-03-21 RX ORDER — TRIAMCINOLONE ACETONIDE 1 MG/G
OINTMENT TOPICAL
Qty: 60 G | Refills: 1 | Status: SHIPPED | OUTPATIENT
Start: 2019-03-21 | End: 2019-11-12

## 2019-03-22 NOTE — ASSESSMENT & PLAN NOTE
The past patient was describing some presyncope.  He performed an EKG and Holter monitor, which were reassuring.  His symptoms resolved but over the past several weeks have started to recur again.  He states that he wakes up in the night and he feels as if he is going to pass out, however, he has not passed out yet.  The patient is well conditioned and has a low resting heart rate.  He denies associated chest pain, shortness of breath or palpitations.  He is already drinking plenty water and eating salty snacks.

## 2019-03-22 NOTE — ASSESSMENT & PLAN NOTE
Patient states that about a month ago he decided to put Biofreeze on his posterior neck because of some neck pain.  He also, shortly thereafter, decided to use an over-the-counter TENS unit.  About 2 weeks ago he started to develop some discomfort and itching on the neck.  His symptoms are currently mild in severity but, at one point, they were moderate in severity.  Stopping the medicines has helped, however, he has not tried any other treatments.

## 2019-03-22 NOTE — PROGRESS NOTES
[facility]  Progress Note  Established Patient    Subjective:   Venkata Funes is a 58 y.o. male here today with a chief complaint of skin rash. The patient is alone.     Contact dermatitis  Patient states that about a month ago he decided to put Biofreeze on his posterior neck because of some neck pain.  He also, shortly thereafter, decided to use an over-the-counter TENS unit.  About 2 weeks ago he started to develop some discomfort and itching on the neck.  His symptoms are currently mild in severity but, at one point, they were moderate in severity.  Stopping the medicines has helped, however, he has not tried any other treatments.    Postural dizziness with presyncope  The past patient was describing some presyncope.  He performed an EKG and Holter monitor, which were reassuring.  His symptoms resolved but over the past several weeks have started to recur again.  He states that he wakes up in the night and he feels as if he is going to pass out, however, he has not passed out yet.  The patient is well conditioned and has a low resting heart rate.  He denies associated chest pain, shortness of breath or palpitations.  He is already drinking plenty water and eating salty snacks.      Current Outpatient Prescriptions on File Prior to Visit   Medication Sig Dispense Refill   • Multiple Vitamin (MULTI-VITAMIN PO) Take  by mouth.     • finasteride (PROSCAR) 5 MG Tab Take 5 mg by mouth every day.     • MINOXIDIL EX 0.1 mL by Apply externally route every day.     • ibuprofen (MOTRIN) 200 MG Tab Take 200 mg by mouth every 6 hours as needed.       No current facility-administered medications on file prior to visit.        Past Medical History:   Diagnosis Date   • Allergy    • Anxiety    • Back pain    • Cataract    • Chickenpox    • Sinusitis        Allergies: Patient has no known allergies.    Surgical History:  has a past surgical history that includes cataract extraction with iol; nasal septal  "reconstruction; eye surgery; and vasectomy.    Family History: family history includes Depression in his daughter; Heart Failure in his father.    Social History:  reports that he has never smoked. He has never used smokeless tobacco. He reports that he drinks alcohol. He reports that he does not use drugs.    ROS:  See HPI.        Objective:     Vitals:    03/21/19 1312   BP: 112/64   BP Location: Right arm   Patient Position: Sitting   BP Cuff Size: Adult   Pulse: (!) 50   Resp: 18   Temp: 36.2 °C (97.1 °F)   TempSrc: Temporal   SpO2: 98%   Weight: 59 kg (130 lb)   Height: 1.702 m (5' 7\")       Physical Exam:  General: alert in no apparent distress.   Cardio: regular rate and rhythm, no murmurs, rubs or gallops.   Resp: CTAB no w/r/r.   Derm: Posterior neck with a slightly reddish, crusted rash.  There is no tenderness, warmth, induration, crepitus.        Assessment and Plan:     1. Postural dizziness with presyncope  Past EKG and holter reassuring, though does have low baseline HR attributed to good conditioning. Already staying hydrated with salty snacks for any orthostatic etiology.  Considering the persistence, will obtain cardiology consultation as this may represent a symptomatic bradycardia.  - REFERRAL TO CARDIOLOGY    2. Contact dermatitis, unspecified contact dermatitis type, unspecified trigger  - triamcinolone acetonide (KENALOG) 0.1 % Ointment; Apply a thin layer to neck BID PRN rash, do not use longer than 2 weeks.  Dispense: 60 g; Refill: 1        Followup: Return if symptoms worsen or fail to improve.         "

## 2019-03-25 ENCOUNTER — PHYSICAL THERAPY (OUTPATIENT)
Dept: PHYSICAL THERAPY | Facility: REHABILITATION | Age: 59
End: 2019-03-25
Attending: FAMILY MEDICINE
Payer: COMMERCIAL

## 2019-03-25 DIAGNOSIS — M25.511 CHRONIC RIGHT SHOULDER PAIN: ICD-10-CM

## 2019-03-25 DIAGNOSIS — G89.29 CHRONIC RIGHT SHOULDER PAIN: ICD-10-CM

## 2019-03-25 PROCEDURE — 97110 THERAPEUTIC EXERCISES: CPT

## 2019-03-25 PROCEDURE — 97140 MANUAL THERAPY 1/> REGIONS: CPT

## 2019-03-25 NOTE — OP THERAPY DAILY TREATMENT
Outpatient Physical Therapy  DAILY TREATMENT     [facility] Physical [address].  [address]  [place] [address]  Phone:  [phone]  Fax:  [phone]    Date: 03/25/2019    Patient: Venkata Funes  YOB: 1960  MRN: [id number]     Time Calculation  Start time: 0800  Stop time: 0850 Time Calculation (min): 50 minutes     Chief Complaint: No chief complaint on file.    Visit #: 9    SUBJECTIVE:  No significant change since last visit  OBJECTIVE:  Current objective measures: Full pain free AROM right GH joint          Therapeutic Exercises (CPT 44078):     1. Foam roller alt shoulder flexion, horizontal abduction pec stretch, 5 min, reviewed only    2. Repeated EROM flexion with wand in standing, x 15, reviewedonly    3. Low trap box exercise on supine pink tband, x 15    4. Scaption with green tband against wall, 2 x 15, reviewed only    5. Roller serratus on wall, x 20    6. 90/90 walk back  iso ER with pink tbad, x 10, reviewed only    7. Posterior capsule stretch, x 10 reps    8. Er with pink tband arm in neutral, x 30, review only    9. Prone horizontal abduction with 2lb weight, x 30    10. Self mobilization with tennis balls on wall, 1 min    Therapeutic Treatments and Modalities:     1. E Stim Unattended (CPT 48163), 0    2. Manual Therapy (CPT 64626), IASTM right infraspinatus, supraspinatus, teres major, inferior/posterior right GH joint glides with flexion-scoop mob    Time-based treatments/modalities:  Manual therapy minutes (CPT 31175): 10 minutes  Therapeutic exercise minutes (CPT 22408): 25 minutes       Pain rating before treatment: 2  Pain rating after treatment: 0    ASSESSMENT:   Response to treatment: multiple trigger points right infraspinatus-responds to soft tissue mobilization but returns.  Patient may be overloading the tissue with home program.  Decreased strength and endurance with horizontal abduction prone eccentric and concentric.  UPOC next  visit  PLAN/RECOMMENDATIONS:   Plan for treatment: therapy treatment to continue next visit.  Planned interventions for next visit: continue with current treatment.

## 2019-04-08 ENCOUNTER — PHYSICAL THERAPY (OUTPATIENT)
Dept: PHYSICAL THERAPY | Facility: REHABILITATION | Age: 59
End: 2019-04-08
Attending: FAMILY MEDICINE
Payer: COMMERCIAL

## 2019-04-08 DIAGNOSIS — G89.29 CHRONIC RIGHT SHOULDER PAIN: ICD-10-CM

## 2019-04-08 DIAGNOSIS — M25.511 CHRONIC RIGHT SHOULDER PAIN: ICD-10-CM

## 2019-04-08 PROCEDURE — 97140 MANUAL THERAPY 1/> REGIONS: CPT

## 2019-04-08 PROCEDURE — 97110 THERAPEUTIC EXERCISES: CPT

## 2019-04-08 NOTE — OP THERAPY PROGRESS SUMMARY
Outpatient Physical Therapy  PROGRESS SUMMARY NOTE      [facility] Therapy [address].  [address]  [place] [address]  Phone:  [phone]  Fax:  [phone]    Date of Visit: 04/08/2019    Patient: Venkata Fuens  YOB: 1960  MRN: [id number]     Referring Provider: Kunal Quach M.D.  [address] [facility]  [address]  [place] [address]   Referring Diagnosis Pain in right shoulder [M25.511];Other chronic pain [G89.29]     Visit Diagnoses     ICD-10-CM   1. Chronic right shoulder pain M25.511    G89.29       Rehab Potential: excellent    Physical Therapy Occurrence Codes    Date physical therapy care plan established or reviewed:  2/5/19   Date physical therapy treatment started:  2/5/19          Cert Period: 4/8/2019 to 7/1/2019  Progress Report Period: 2/5/2019 - 4/8/2019    Functional Limitations and Severity Modifiers  Quickdash General Total Score: 18.18   Current status:     Goal status:           Objective Findings and Assessment:   Patient progression towards goals: Patient able to perform ADLS and household chores with > 50% decreased symptoms MET  Full AROM right GH joint painfree >75% of the time MET    Long Term Goals:    Independent with home exercise program MET  Return to tennis and/or hockey with >75% decreased symptoms. Partially MET    Objective findings and assessment details: Patient has been seen for 10 visits with focus on Right GH joint mobility, AROM, scapular stabilization and rotator cuff strengthening.  Patient continues to have trigger points and soft tissue restriction  in the right infraspinatus and teres minor. Patient also demonstrates decreased endurance/strength infraspinatus at EROM. Symptoms are improving slowly.  Patient is Independent with HEP and is motivated and compliant with plan of care.  Recommend continued PT to progress toward goals stated below.     Goals:   Short Term Goals:   Patient able to perform all ADLS Symptom  free >75% of time  Return to tennis with > 75% decreased symptoms  Short term goal time span:  1-2 months      Long Term Goals:    Independent with home exercise program symptom free  Return to unrestricted kayacking, hockey and tennis  Long term goal time span:  2-4 months    Plan:   Planned therapy interventions:  E Stim Unattended (CPT 05324), Manual Therapy (CPT 25861), Neuromuscular Re-education (CPT 78070) and Therapeutic Exercise (CPT 52478)  Frequency:  1x month  Duration in weeks:  12  Duration in visits:  4        Referring provider co-signature:  I have reviewed this plan of care and my co-signature certifies the need for services.    Physician Signature: ________________________________ Date: ______________

## 2019-04-08 NOTE — OP THERAPY DAILY TREATMENT
Outpatient Physical Therapy  DAILY TREATMENT     [facility] Physical [address]  [address]  [place] [address]  Phone:  [phone]  Fax:  [phone]    Date: 04/08/2019    Patient: Venkata Funes  YOB: 1960  MRN: [id number]     Time Calculation  Start time: 0800  Stop time: 0850 Time Calculation (min): 50 minutes     Chief Complaint: No chief complaint on file.    Visit #: 10    SUBJECTIVE:  Getting stronger, using tennis ball aggressively at home, no symptoms currently    OBJECTIVE:  Current objective measures:           Therapeutic Exercises (CPT 98958):     1. Foam roller alt shoulder flexion, horizontal abduction pec stretch, 5 min, reviewed only    2. Repeated EROM flexion with wand supine with clinician op, x 15    3. Sarhman wall slide with lift off, x 20    5. Roller serratus on wall, x 20    6. 90/90 walk back  iso ER with pink tbad, x 10    7. Posterior capsule stretch, x 10 reps    8. Er with pink tband 90/90, x 30    9. Prone horizontal abduction with 2lb weight, x 30, reviewed    10. Self mobilization with tennis balls on wall, 1 min    Therapeutic Treatments and Modalities:     1. E Stim Unattended (CPT 76664), 0    2. Manual Therapy (CPT 91777), IASTM right infraspinatus, supraspinatus, teres major, inferior/posterior right GH joint glides with flexion-scoop mob    Time-based treatments/modalities:  Manual therapy minutes (CPT 99313): 15 minutes  Therapeutic exercise minutes (CPT 32113): 35 minutes       ASSESSMENT:   Response to treatment: improved EROM strength right shoulder rotator cuff/scapular stabilizers.      PLAN/RECOMMENDATIONS:   Plan for treatment: therapy treatment to continue next visit. 1 visit every 3-4 weeks x 12 weeks  Planned interventions for next visit: continue with current treatment.

## 2019-04-29 ENCOUNTER — PHYSICAL THERAPY (OUTPATIENT)
Dept: PHYSICAL THERAPY | Facility: REHABILITATION | Age: 59
End: 2019-04-29
Attending: FAMILY MEDICINE
Payer: COMMERCIAL

## 2019-04-29 DIAGNOSIS — G89.29 CHRONIC RIGHT SHOULDER PAIN: ICD-10-CM

## 2019-04-29 DIAGNOSIS — M25.511 CHRONIC RIGHT SHOULDER PAIN: ICD-10-CM

## 2019-04-29 PROCEDURE — 97140 MANUAL THERAPY 1/> REGIONS: CPT

## 2019-04-29 PROCEDURE — 97110 THERAPEUTIC EXERCISES: CPT

## 2019-04-29 NOTE — OP THERAPY DAILY TREATMENT
Outpatient Physical Therapy  DAILY TREATMENT     [facility] Physical [address]  [address]  [place] [address]  Phone:  [phone]  Fax:  [phone]    Date: 04/29/2019    Patient: Venkata Funes  YOB: 1960  MRN: [id number]     Time Calculation  Start time: 1430  Stop time: 1500 Time Calculation (min): 30 minutes     Chief Complaint: No chief complaint on file.    Visit #: 11    SUBJECTIVE:  Better, decreased infraspinatus pain, shoulder feeling stronger    OBJECTIVE:  Current objective measures:           Therapeutic Exercises (CPT 92157):     1. Foam roller alt shoulder flexion, horizontal abduction pec stretch, 5 min, reviewed only    2. Repeated EROM flexion with wand supine with clinician op, x 15    3. Sarhman wall slide with lift off, x 20    5. Roller serratus on wall, x 20    6. 90/90 walk back  iso ER with pink tbad, x 10, reviewed    7. Posterior capsule stretch, x 10 reps    8. Er with pink tband 90/90, x 30    9. Prone horizontal abduction with 2lb weight, x 30, reviewed    10. Self mobilization with tennis balls on wall, 1 min    Therapeutic Treatments and Modalities:     1. E Stim Unattended (CPT 54677), 0    2. Manual Therapy (CPT 84428), IASTM right infraspinatus, supraspinatus, teres major, inferior/posterior right GH joint glides with flexion-scoop mob    Time-based treatments/modalities:  Manual therapy minutes (CPT 23750): 10 minutes  Therapeutic exercise minutes (CPT 04770): 20 minutes       ASSESSMENT:   Response to treatment: decreased infraspinatus hypertonicity and improved overall strength right scapular stabilizers.  Patient is doing well with home program.      PLAN/RECOMMENDATIONS:   Plan for treatment: therapy treatment to continue next visit. Progress home program, end rom strengthening  Planned interventions for next visit: continue with current treatment.

## 2019-05-20 ENCOUNTER — PHYSICAL THERAPY (OUTPATIENT)
Dept: PHYSICAL THERAPY | Facility: REHABILITATION | Age: 59
End: 2019-05-20
Attending: FAMILY MEDICINE
Payer: COMMERCIAL

## 2019-05-20 DIAGNOSIS — G89.29 CHRONIC RIGHT SHOULDER PAIN: ICD-10-CM

## 2019-05-20 DIAGNOSIS — M25.511 CHRONIC RIGHT SHOULDER PAIN: ICD-10-CM

## 2019-05-20 PROCEDURE — 97140 MANUAL THERAPY 1/> REGIONS: CPT

## 2019-05-20 PROCEDURE — 97110 THERAPEUTIC EXERCISES: CPT

## 2019-05-20 NOTE — OP THERAPY DAILY TREATMENT
Outpatient Physical Therapy  DAILY TREATMENT     [facility] Physical [address].  [address]  [place] [address]  Phone:  [phone]  Fax:  [phone]    Date: 05/20/2019    Patient: Venkata Funes  YOB: 1960  MRN: [id number]     Time Calculation  Start time: 0930  Stop time: 1005 Time Calculation (min): 35 minutes     Chief Complaint: No chief complaint on file.    Visit #: 12    SUBJECTIVE:  Better, right neck tightness, and crepitus    OBJECTIVE:  Current objective measures:           Therapeutic Exercises (CPT 32590):     1. Foam roller alt shoulder flexion, horizontal abduction pec stretch, 5 min, reviewed only    2. Ski jumper on ball, 4 x 30 sec    3. Sarhman wall slide with lift off, x 20    4. Doorway shoulder flexion stretch, 2 x 30 sec    7. Posterior capsule stretch, x 10 reps    8. Er with pink tband 90/90, x 30    Therapeutic Treatments and Modalities:     1. E Stim Unattended (CPT 62270), 0    2. Manual Therapy (CPT 22582), IASTM right levator scap and cervical paraspinals    Time-based treatments/modalities:  Manual therapy minutes (CPT 89870): 10 minutes  Therapeutic exercise minutes (CPT 03154): 20 minutes       Pain rating before treatment: 0  Pain rating after treatment: 0    ASSESSMENT:   Response to treatment: progressing well with infraspinatus strength, continued posture dysfunction, weakness scapular stabilizers.  Next visit, progress home program shoulder strength for kayaking, last visit then DC    PLAN/RECOMMENDATIONS:   Plan for treatment: therapy treatment to continue next visit.  Planned interventions for next visit: continue with current treatment.

## 2019-06-24 ENCOUNTER — PHYSICAL THERAPY (OUTPATIENT)
Dept: PHYSICAL THERAPY | Facility: REHABILITATION | Age: 59
End: 2019-06-24
Attending: FAMILY MEDICINE
Payer: COMMERCIAL

## 2019-06-24 DIAGNOSIS — G89.29 CHRONIC RIGHT SHOULDER PAIN: ICD-10-CM

## 2019-06-24 DIAGNOSIS — M25.511 CHRONIC RIGHT SHOULDER PAIN: ICD-10-CM

## 2019-06-24 PROCEDURE — 97110 THERAPEUTIC EXERCISES: CPT

## 2019-06-24 NOTE — OP THERAPY DAILY TREATMENT
Outpatient Physical Therapy  DAILY TREATMENT     [facility] Physical [address].  [address]  [place] [address]  Phone:  [phone]  Fax:  [phone]    Date: 06/24/2019    Patient: Venkata Funes  YOB: 1960  MRN: [id number]     Time Calculation  Start time: 1500  Stop time: 1535 Time Calculation (min): 35 minutes     Chief Complaint: shoulder problem  Visit #: 13    SUBJECTIVE: played tennis and kayaked without symptoms except for repetitive serving in tennis      OBJECTIVE:  Current objective measures:           Therapeutic Exercises (CPT 08207):     1. Foam roller alt shoulder flexion, horizontal abduction pec stretch, 5 min    2. Ski jumper on ball, 4 x 30 sec    4. Doorway shoulder flexion stretch, 2 x 30 sec, review    5. Ball Low trap Ys, Ts,, x 10 each    6. Bent rows with 10lb weight, x 10    7. Head crushers triceps supine at 90 degrees, x 10    8. Er with pink tband 90/90, x 30    9. Front plank, 2 x 30 sec    10. Horizontal abduction with band, x 10 green tube    Therapeutic Treatments and Modalities:     1. E Stim Unattended (CPT 33291), 0    2. Manual Therapy (CPT 10606), 0    Time-based treatments/modalities:  Therapeutic exercise minutes (CPT 75871): 30 minutes       Pain rating before treatment: 0  Pain rating after treatment: 0    ASSESSMENT:   Response to treatment: Patient demonstrating good scapular control and upper extremity strength.  Patient continues to complain of intermittent teres major/Lat pain and soft tissue restriction but most other symptoms have resolved. Patient has met PT goals    PLAN/RECOMMENDATIONS:   Plan for treatment:Discharge  Planned interventions for next visit: Discharge.

## 2019-06-24 NOTE — OP THERAPY DISCHARGE SUMMARY
Outpatient Physical Therapy  DISCHARGE SUMMARY NOTE      [facility] Therapy [address]  [address]  [facility] [address]  Phone:  [phone]  Fax:  [phone]    Date of Visit: 06/24/2019    Patient: Venkata Funes  YOB: 1960  MRN: [id number]     Referring Provider: Kunal Quach M.D.  [address] [facility]  [address]  [place] [address]   Referring Diagnosis Pain in right shoulder [M25.511];Other chronic pain [G89.29]     Physical Therapy Occurrence Codes    Date physical therapy care plan established or reviewed:  2/5/19   Date physical therapy treatment started:  2/5/19          Functional Limitations and Severity Modifiers      Goal:     Discharge:         Your patient is being discharged from Physical Therapy with the following comments:   · Goals met    Comments:  Patient is asymptomatic and is able to kayak and play tennis .       Limitations Remaining:  Soft tissue restriction teres major, Lats and infraspinatus    Recommendations:  Discharge with [facility]    Natasha Watt PT, MSPT    Date: 6/24/2019

## 2019-08-06 ENCOUNTER — OFFICE VISIT (OUTPATIENT)
Dept: CARDIOLOGY | Facility: MEDICAL CENTER | Age: 59
End: 2019-08-06
Payer: COMMERCIAL

## 2019-08-06 VITALS
WEIGHT: 133 LBS | HEART RATE: 58 BPM | SYSTOLIC BLOOD PRESSURE: 112 MMHG | HEIGHT: 66 IN | DIASTOLIC BLOOD PRESSURE: 66 MMHG | BODY MASS INDEX: 21.38 KG/M2

## 2019-08-06 DIAGNOSIS — R42 ORTHOSTATIC DIZZINESS: ICD-10-CM

## 2019-08-06 DIAGNOSIS — E78.5 DYSLIPIDEMIA: ICD-10-CM

## 2019-08-06 DIAGNOSIS — Z91.89 OTHER SPECIFIED PERSONAL RISK FACTORS, NOT ELSEWHERE CLASSIFIED: ICD-10-CM

## 2019-08-06 PROCEDURE — 99242 OFF/OP CONSLTJ NEW/EST SF 20: CPT | Performed by: INTERNAL MEDICINE

## 2019-08-06 ASSESSMENT — ENCOUNTER SYMPTOMS
RESPIRATORY NEGATIVE: 1
DIZZINESS: 1
MUSCULOSKELETAL NEGATIVE: 1
GASTROINTESTINAL NEGATIVE: 1
CONSTITUTIONAL NEGATIVE: 1
LOSS OF CONSCIOUSNESS: 0
DEPRESSION: 0
CARDIOVASCULAR NEGATIVE: 1

## 2019-08-06 NOTE — PROGRESS NOTES
Chief Complaint   Patient presents with   • Dizziness       Subjective:   Venkata Funes is a 58 y.o. male who was seen in consultation at the request of Dr. Quach for evaluation of orthostatic dizziness.  The patient is unusually physically active and enjoys mountain biking, Trail running, running on his treadmill at home and also running in the urban environment.  There is no history of exercise intolerance, exertional chest pain, dyspnea or sense of palpitations.  He notes that he gets dizzy when he first stands up but this passes quickly and is able to exercise without restriction.  He had a previous Holter monitor which I personally reviewed and demonstrated PACs, PVCs, ventricular triplet, and a brief run of slow SVT.    Cardiac risk factor profile is negative for diabetes, hypertension, smoking, or family history of premature coronary disease.  His LDL is mildly elevated per history.  We will obtain lab results.    Social history: Retired, non-smoker, exercises very regularly and vigorously.    Past Medical History:   Diagnosis Date   • Allergy    • Anxiety    • Back pain    • Cataract    • Chickenpox    • Sinusitis      Past Surgical History:   Procedure Laterality Date   • CATARACT EXTRACTION WITH IOL     • EYE SURGERY     • NASAL SEPTAL RECONSTRUCTION     • VASECTOMY       Family History   Problem Relation Age of Onset   • Depression Daughter    • Heart Failure Father    • Stroke Neg Hx      Social History     Socioeconomic History   • Marital status:      Spouse name: Not on file   • Number of children: Not on file   • Years of education: Not on file   • Highest education level: Not on file   Occupational History   • Not on file   Social Needs   • Financial resource strain: Not on file   • Food insecurity:     Worry: Not on file     Inability: Not on file   • Transportation needs:     Medical: Not on file     Non-medical: Not on file   Tobacco Use   • Smoking status: Never Smoker   •  Smokeless tobacco: Never Used   Substance and Sexual Activity   • Alcohol use: Yes     Comment:  1-2 a night    • Drug use: No   • Sexual activity: Yes     Partners: Female   Lifestyle   • Physical activity:     Days per week: Not on file     Minutes per session: Not on file   • Stress: Not on file   Relationships   • Social connections:     Talks on phone: Not on file     Gets together: Not on file     Attends [employer] service: Not on file     Active member of club or organization: Not on file     Attends meetings of clubs or organizations: Not on file     Relationship status: Not on file   • Intimate partner violence:     Fear of current or ex partner: Not on file     Emotionally abused: Not on file     Physically abused: Not on file     Forced sexual activity: Not on file   Other Topics Concern   • Not on file   Social History Narrative   • Not on file     No Known Allergies  Outpatient Encounter Medications as of 8/6/2019   Medication Sig Dispense Refill   • Multiple Vitamin (MULTI-VITAMIN PO) Take  by mouth.     • ibuprofen (MOTRIN) 200 MG Tab Take 200 mg by mouth every 6 hours as needed.     • triamcinolone acetonide (KENALOG) 0.1 % Ointment Apply a thin layer to neck BID PRN rash, do not use longer than 2 weeks. (Patient not taking: Reported on 8/6/2019) 60 g 1   • finasteride (PROSCAR) 5 MG Tab Take 5 mg by mouth every day.     • MINOXIDIL EX 0.1 mL by Apply externally route every day.       No facility-administered encounter medications on file as of 8/6/2019.      Review of Systems   Constitutional: Negative.    HENT: Negative.    Respiratory: Negative.    Cardiovascular: Negative.    Gastrointestinal: Negative.    Musculoskeletal: Negative.    Skin: Negative.    Neurological: Positive for dizziness. Negative for loss of consciousness.        Dizziness on standing.   Endo/Heme/Allergies: Negative.    Psychiatric/Behavioral: Negative for depression.        Objective:   /66 (BP Location: Left arm,  "Patient Position: Sitting, BP Cuff Size: Adult)   Pulse (!) 58   Ht 1.676 m (5' 6\")   Wt 60.3 kg (133 lb)   BMI 21.47 kg/m²     Physical Exam   Constitutional: He is oriented to person, place, and time. He appears well-nourished. No distress.   HENT:   Head: Normocephalic and atraumatic.   Eyes: Pupils are equal, round, and reactive to light. No scleral icterus.   Neck: No JVD present. No tracheal deviation present.   Cardiovascular: Normal rate and regular rhythm.   No murmur heard.  Pulmonary/Chest: Breath sounds normal. No respiratory distress. He has no wheezes.   Abdominal: Soft. Bowel sounds are normal. He exhibits no distension. There is no tenderness.   Musculoskeletal: He exhibits no edema.   Neurological: He is alert and oriented to person, place, and time.   Skin: Skin is warm and dry.   Psychiatric: He has a normal mood and affect.       Assessment:     1. Dyslipidemia     2. Orthostatic dizziness     3. Other specified personal risk factors, not elsewhere classified  CT-CARDIAC SCORING       Medical Decision Making:  Today's Assessment / Status / Plan:   Orthostatic dizziness: Orthostatic blood pressures were performed in the office today.  His baseline pressure is 110/70 dropping to 95 with standing.  There is no increase in heart rate to suggest he has POTS.  He has blood pressure and symptoms recovered within a minute.    He has orthostatic dizziness most likely secondary to his body habitus and blood volume.  Would not recommend salt tablets or mineralocorticoids at this time.  Discussed adequate water intake and I replacement.    He has a history of mildly elevated cholesterol.  A treadmill is not indicated at this time as he has an excellent exercise tolerance and normal resting EKG  Recommend he have a coronary calcification study as a guide to future cardiac risk.  He will be notified of the results.  Return as needed.  "

## 2019-08-19 ENCOUNTER — HOSPITAL ENCOUNTER (OUTPATIENT)
Dept: RADIOLOGY | Facility: MEDICAL CENTER | Age: 59
End: 2019-08-19
Attending: INTERNAL MEDICINE
Payer: COMMERCIAL

## 2019-08-19 DIAGNOSIS — Z91.89 OTHER SPECIFIED PERSONAL RISK FACTORS, NOT ELSEWHERE CLASSIFIED: ICD-10-CM

## 2019-08-19 PROCEDURE — 4410556 CT-CARDIAC SCORING

## 2019-11-12 ENCOUNTER — OFFICE VISIT (OUTPATIENT)
Dept: MEDICAL GROUP | Facility: MEDICAL CENTER | Age: 59
End: 2019-11-12
Payer: COMMERCIAL

## 2019-11-12 VITALS
DIASTOLIC BLOOD PRESSURE: 68 MMHG | BODY MASS INDEX: 21.5 KG/M2 | RESPIRATION RATE: 18 BRPM | HEIGHT: 66 IN | SYSTOLIC BLOOD PRESSURE: 110 MMHG | OXYGEN SATURATION: 96 % | HEART RATE: 74 BPM | TEMPERATURE: 98.3 F | WEIGHT: 133.8 LBS

## 2019-11-12 DIAGNOSIS — D48.5 NEOPLASM OF UNCERTAIN BEHAVIOR OF SKIN: ICD-10-CM

## 2019-11-12 DIAGNOSIS — L82.1 SEBORRHEIC KERATOSIS: ICD-10-CM

## 2019-11-12 DIAGNOSIS — L98.9 SKIN LESION: ICD-10-CM

## 2019-11-12 PROCEDURE — 11104 PUNCH BX SKIN SINGLE LESION: CPT | Mod: 59 | Performed by: FAMILY MEDICINE

## 2019-11-12 PROCEDURE — 99000 SPECIMEN HANDLING OFFICE-LAB: CPT | Performed by: FAMILY MEDICINE

## 2019-11-12 PROCEDURE — 17110 DESTRUCTION B9 LES UP TO 14: CPT | Performed by: FAMILY MEDICINE

## 2019-11-12 PROCEDURE — 11103 TANGNTL BX SKIN EA SEP/ADDL: CPT | Performed by: FAMILY MEDICINE

## 2019-11-13 ENCOUNTER — HOSPITAL ENCOUNTER (OUTPATIENT)
Dept: LAB | Facility: MEDICAL CENTER | Age: 59
End: 2019-11-13
Attending: FAMILY MEDICINE
Payer: COMMERCIAL

## 2019-11-13 LAB
FORWARD REASON: SPWHY: NORMAL
FORWARDED TO LAB: SPWHR: NORMAL
PATHOLOGY CONSULT NOTE: NORMAL
SPECIMEN SENT (2ND): SPWT2: NORMAL
SPECIMEN SENT (3RD): SPWT3: NORMAL
SPECIMEN SENT (4TH): SPWT4: NORMAL
SPECIMEN SENT: SPWT1: NORMAL

## 2019-11-13 NOTE — PROGRESS NOTES
"[facility]  Progress Note  Established Patient    Subjective:   Venkata Funes is a 58 y.o. male here today for a skin check. The patient is alone.     Neoplasm of uncertain behavior of skin  The patient presents today for a skin check.  He identifies a mole on his back which his wife wanted him to have evaluated.    Seborrheic keratosis  Patient has a raised, rough skin lesion on his back which is bothersome to him.  It tends to catch on clothing.      Current Outpatient Medications on File Prior to Visit   Medication Sig Dispense Refill   • Multiple Vitamin (MULTI-VITAMIN PO) Take  by mouth.     • ibuprofen (MOTRIN) 200 MG Tab Take 200 mg by mouth every 6 hours as needed.       No current facility-administered medications on file prior to visit.        Past Medical History:   Diagnosis Date   • Allergy    • Anxiety    • Back pain    • Cataract    • Chickenpox    • Sinusitis        Allergies: Patient has no known allergies.    Surgical History:  has a past surgical history that includes cataract extraction with iol; nasal septal reconstruction; eye surgery; and vasectomy.    Family History: family history includes Depression in his daughter; Heart Failure in his father.    Social History:  reports that he has never smoked. He has never used smokeless tobacco. He reports current alcohol use. He reports that he does not use drugs.    ROS: no fever.        Objective:     Vitals:    11/12/19 1527   BP: 110/68   BP Location: Left arm   Patient Position: Sitting   BP Cuff Size: Adult   Pulse: 74   Resp: 18   Temp: 36.8 °C (98.3 °F)   TempSrc: Temporal   SpO2: 96%   Weight: 60.7 kg (133 lb 12.8 oz)   Height: 1.676 m (5' 6\")       Physical Exam:  General: alert in no apparent distress.   Skin: On the right chest there is a color variegated skin lesion with border irregularity.  On the back there is an approximately 0.5 cm flesh-colored skin lesion with some color variegation present.  There is also a rough, " raised skin lesion that is pigmented.    Cryotherapy Procedure Note:  I discussed the risks and benefits of cryotherapy with the patient who gave verbal consent for the procedure. Three applications of cryotherapy were applied to the rough, raised skin lesion described above which is c/w a seborrheic keratosis. The patient tolerated the procedure well and there were no complications. Aftercare was discussed.    Shave Biopsy Procedure Note:  Informed consent was obtained. The area was cleansed with betadine and 1% lidocaine with epinephrine was used for local anesthesia. A shave biopsy was taken of the flesh colored skin lesion with some color variegation on the back described above. Minimal bleeding was encountered and hemostasis was achieved with electrocautery. There were no complications. The area was dressed with bacitracin and a Band-Aid. Aftercare was discussed with the patient.    Punch Biopsy Procedure Note:  Informed consent was obtained. The area was cleansed with betadine and 1% lidocaine with epinephrine was used for local anesthesia. A 2 mm punch biopsy was taken of the chest skin lesion described above. Minimal bleeding was encountered and hemostasis was achieved with electrocautery. There were no complications. The area was dressed with bacitracin and a Band-Aid. Aftercare was discussed with the patient.      Assessment and Plan:     . Seborrheic keratosis  - cryotherapy (see procedure note above).     2. Neoplasm of uncertain behavior of skin  - shave biopsy and punch biopsy taken as these are concerning for cancer (see procedure notes above).        11/26/19 PATHOLOGY ADDENDUM (see media):  Skin, Back: predominantly intradermal melanocytic nevus.   Skin, Chest: Junctional lentiginous melanocytic nevus.       Followup: Return if symptoms worsen or fail to improve.

## 2019-11-13 NOTE — ASSESSMENT & PLAN NOTE
The patient presents today for a skin check.  He identifies a mole on his back which his wife wanted him to have evaluated.

## 2019-11-13 NOTE — ASSESSMENT & PLAN NOTE
Patient has a raised, rough skin lesion on his back which is bothersome to him.  It tends to catch on clothing.

## 2020-01-14 ENCOUNTER — OFFICE VISIT (OUTPATIENT)
Dept: MEDICAL GROUP | Facility: MEDICAL CENTER | Age: 60
End: 2020-01-14
Payer: COMMERCIAL

## 2020-01-14 VITALS
TEMPERATURE: 97.5 F | WEIGHT: 134.8 LBS | BODY MASS INDEX: 21.66 KG/M2 | DIASTOLIC BLOOD PRESSURE: 62 MMHG | HEART RATE: 62 BPM | SYSTOLIC BLOOD PRESSURE: 104 MMHG | RESPIRATION RATE: 18 BRPM | OXYGEN SATURATION: 97 % | HEIGHT: 66 IN

## 2020-01-14 DIAGNOSIS — Z12.5 SCREENING FOR PROSTATE CANCER: ICD-10-CM

## 2020-01-14 DIAGNOSIS — Z00.00 HEALTHCARE MAINTENANCE: ICD-10-CM

## 2020-01-14 DIAGNOSIS — Z13.1 SCREENING FOR DIABETES MELLITUS: ICD-10-CM

## 2020-01-14 DIAGNOSIS — E78.5 DYSLIPIDEMIA: ICD-10-CM

## 2020-01-14 PROBLEM — D48.5 NEOPLASM OF UNCERTAIN BEHAVIOR OF SKIN: Status: RESOLVED | Noted: 2019-11-12 | Resolved: 2020-01-14

## 2020-01-14 PROCEDURE — 99396 PREV VISIT EST AGE 40-64: CPT | Performed by: FAMILY MEDICINE

## 2020-01-14 RX ORDER — MULTIVIT WITH MINERALS/LUTEIN
1 TABLET ORAL DAILY
COMMUNITY
End: 2022-03-22

## 2020-01-14 RX ORDER — AMOXICILLIN 500 MG
2 CAPSULE ORAL DAILY
COMMUNITY

## 2020-01-14 ASSESSMENT — PATIENT HEALTH QUESTIONNAIRE - PHQ9: CLINICAL INTERPRETATION OF PHQ2 SCORE: 0

## 2020-01-14 NOTE — ASSESSMENT & PLAN NOTE
Lipids: done 2019.   Fasting Glucose: ordered.  PSA: ordered.  Hepatitis C Screen: done 2018 and normal.   Colonoscopy: done 11/16/17 with 5 year recall.     Flu vaccine: given 2018.   Tdap: given 2012.

## 2020-01-14 NOTE — PROGRESS NOTES
[facility]  Progress Note  Established Patient    Subjective:   Venkata Funes is a 59 y.o. male here today for a wellness exam. The patient is alone.     Dyslipidemia  The patient has a slight dyslipidemia but his recent coronary calcium score was 0.    Healthcare maintenance  Lipids: done 2019.   Fasting Glucose: ordered.  PSA: ordered.  Hepatitis C Screen: done 2018 and normal.   Colonoscopy: done 11/16/17 with 5 year recall.     Flu vaccine: given 2018.   Tdap: given 2012.       Current Outpatient Medications on File Prior to Visit   Medication Sig Dispense Refill   • TURMERIC PO Take 2 Caps by mouth every day.     • Omega-3 Fatty Acids (FISH OIL) 1200 MG Cap Take 2 Caps by mouth every day.     • Ascorbic Acid (VITAMIN C) 1000 MG Tab Take 1 Tab by mouth every day.     • Calcium-Magnesium-Vitamin D (CALCIUM MAGNESIUM PO) Take 1 Tab by mouth every day.     • Multiple Vitamin (MULTI-VITAMIN PO) Take  by mouth.     • ibuprofen (MOTRIN) 200 MG Tab Take 200 mg by mouth every 6 hours as needed.       No current facility-administered medications on file prior to visit.        Past Medical History:   Diagnosis Date   • Allergy    • Anxiety    • Back pain    • Cataract    • Chickenpox    • Sinusitis        Allergies: Patient has no known allergies.    Surgical History:  has a past surgical history that includes cataract extraction with iol; nasal septal reconstruction; eye surgery; and vasectomy.    Family History: family history includes Depression in his daughter; Heart Failure in his father.    Social History:  reports that he has never smoked. He has never used smokeless tobacco. He reports current alcohol use. He reports that he does not use drugs.    ROS: no CP or SOB. No palpitations.        Objective:     Vitals:    01/14/20 0948   BP: 104/62   BP Location: Left arm   Patient Position: Sitting   BP Cuff Size: Adult   Pulse: 62   Resp: 18   Temp: 36.4 °C (97.5 °F)   TempSrc: Temporal   SpO2: 97%  "  Weight: 61.1 kg (134 lb 12.8 oz)   Height: 1.676 m (5' 6\")       Physical Exam:  General: alert in no apparent distress.   Cardio: regular rate and rhythm, no murmurs, rubs or gallops.   Resp: CTAB no w/r/r.         Assessment and Plan:     1. Healthcare maintenance  - see HPI.   -Age-appropriate anticipatory guidance discussed including diet and exercise.  - Blood Glucose; Future  - PROSTATE SPECIFIC AG SCREENING; Future    2. Screening for prostate cancer  - PROSTATE SPECIFIC AG SCREENING; Future    3. Screening for diabetes mellitus  - Blood Glucose; Future    4. Dyslipidemia  -Coronary calcium score is 0.        Followup: Return in about 1 year (around 1/14/2021), or if symptoms worsen or fail to improve, for Wellness Visit, Long.         "

## 2020-04-08 ENCOUNTER — SUPERVISING PHYSICIAN REVIEW (OUTPATIENT)
Dept: URGENT CARE | Facility: CLINIC | Age: 60
End: 2020-04-08

## 2020-04-08 ENCOUNTER — TELEPHONE (OUTPATIENT)
Dept: HEALTH INFORMATION MANAGEMENT | Facility: OTHER | Age: 60
End: 2020-04-08

## 2020-04-08 ENCOUNTER — OFFICE VISIT (OUTPATIENT)
Dept: URGENT CARE | Facility: CLINIC | Age: 60
End: 2020-04-08
Payer: COMMERCIAL

## 2020-04-08 VITALS
DIASTOLIC BLOOD PRESSURE: 68 MMHG | SYSTOLIC BLOOD PRESSURE: 112 MMHG | HEIGHT: 66 IN | BODY MASS INDEX: 21.53 KG/M2 | OXYGEN SATURATION: 96 % | WEIGHT: 134 LBS | RESPIRATION RATE: 12 BRPM | HEART RATE: 60 BPM | TEMPERATURE: 98.1 F

## 2020-04-08 DIAGNOSIS — J01.40 SUBACUTE PANSINUSITIS: ICD-10-CM

## 2020-04-08 DIAGNOSIS — J02.9 PHARYNGITIS, UNSPECIFIED ETIOLOGY: ICD-10-CM

## 2020-04-08 LAB
INT CON NEG: NEGATIVE
INT CON POS: POSITIVE
S PYO AG THROAT QL: NEGATIVE

## 2020-04-08 PROCEDURE — 99214 OFFICE O/P EST MOD 30 MIN: CPT | Performed by: PHYSICIAN ASSISTANT

## 2020-04-08 PROCEDURE — 87880 STREP A ASSAY W/OPTIC: CPT | Performed by: PHYSICIAN ASSISTANT

## 2020-04-08 RX ORDER — DOXYCYCLINE HYCLATE 100 MG
100 TABLET ORAL 2 TIMES DAILY
Qty: 10 TAB | Refills: 0 | Status: SHIPPED | OUTPATIENT
Start: 2020-04-08 | End: 2020-04-13

## 2020-04-08 ASSESSMENT — ENCOUNTER SYMPTOMS
SHORTNESS OF BREATH: 0
CHILLS: 0
SORE THROAT: 1
COUGH: 0
DIZZINESS: 0
MYALGIAS: 0
EYE REDNESS: 0
PALPITATIONS: 0
FEVER: 0
NAUSEA: 0
DIARRHEA: 0
HEADACHES: 0
SINUS PAIN: 1
EYE DISCHARGE: 0
ABDOMINAL PAIN: 0
VOMITING: 0

## 2020-04-08 NOTE — PROGRESS NOTES
"Subjective:      Venkata Funes is a 59 y.o. male who presents with Pharyngitis (x 2 months ); Seasonal Allergies (per patient ); and Sinus Problem    HPI:  This is a new problem. Venkata Funes is a 59 y.o. male who presents today for evaluation of sore throat and sinus congestion.  Patient has had sore throat for the past 1.5 months.  He is new to this area.  He also reports issues with sinuses on his left side for which he has seen an ENT in the past.  He has been doing saline nasal rinses as well as using Flonase.  He says that due to problems with his right shoulder he has been having to sleep on his left side.  He said he has not had any runny nose but at nighttime his sinuses feel \"clogged\".  He has been also using throat lozenges doing salt water gargles but states his throat still feels very dry and painful.  He denied any fever, cough, chest pain, shortness of breath, or lightheadedness/dizziness.  No travel within the past 2 weeks.  No exposure to anybody with the novel coronavirus.        Review of Systems   Constitutional: Negative for chills, fever and malaise/fatigue.   HENT: Positive for congestion, sinus pain and sore throat. Negative for ear pain.    Eyes: Negative for discharge and redness.   Respiratory: Negative for cough and shortness of breath.    Cardiovascular: Negative for chest pain and palpitations.   Gastrointestinal: Negative for abdominal pain, diarrhea, nausea and vomiting.   Musculoskeletal: Negative for myalgias.   Skin: Negative for rash.   Neurological: Negative for dizziness and headaches.       PMH:  has a past medical history of Allergy, Anxiety, Back pain, Cataract, Chickenpox, and Sinusitis.  MEDS:   Current Outpatient Medications:   •  Maalox Plus - Diphenhydramine - Lidocaine (MBX Oral Solution), Take 5 mL by mouth every 6 hours as needed for up to 7 days., Disp: 120 mL, Rfl: 0  •  doxycycline (VIBRAMYCIN) 100 MG Tab, Take 1 Tab by mouth 2 times a day for 5 days., " "Disp: 10 Tab, Rfl: 0  •  TURMERIC PO, Take 2 Caps by mouth every day., Disp: , Rfl:   •  Omega-3 Fatty Acids (FISH OIL) 1200 MG Cap, Take 2 Caps by mouth every day., Disp: , Rfl:   •  Ascorbic Acid (VITAMIN C) 1000 MG Tab, Take 1 Tab by mouth every day., Disp: , Rfl:   •  Multiple Vitamin (MULTI-VITAMIN PO), Take  by mouth., Disp: , Rfl:   •  Calcium-Magnesium-Vitamin D (CALCIUM MAGNESIUM PO), Take 1 Tab by mouth every day., Disp: , Rfl:   •  ibuprofen (MOTRIN) 200 MG Tab, Take 200 mg by mouth every 6 hours as needed., Disp: , Rfl:   ALLERGIES: No Known Allergies  SURGHX:   Past Surgical History:   Procedure Laterality Date   • CATARACT EXTRACTION WITH IOL     • EYE SURGERY     • NASAL SEPTAL RECONSTRUCTION     • VASECTOMY       SOCHX:  reports that he has never smoked. He has never used smokeless tobacco. He reports current alcohol use. He reports that he does not use drugs.  FH: Family history was reviewed, no pertinent findings to report     Objective:     /68   Pulse 60   Temp 36.7 °C (98.1 °F)   Resp 12   Ht 1.676 m (5' 6\")   Wt 60.8 kg (134 lb)   SpO2 96%   BMI 21.63 kg/m²      Physical Exam  Constitutional:       Appearance: He is well-developed.   HENT:      Head: Normocephalic and atraumatic.      Right Ear: Tympanic membrane, ear canal and external ear normal.      Left Ear: Tympanic membrane, ear canal and external ear normal.      Nose: Congestion present. No mucosal edema or rhinorrhea.      Right Turbinates: Swollen.      Left Turbinates: Swollen.      Right Sinus: No maxillary sinus tenderness or frontal sinus tenderness.      Left Sinus: No maxillary sinus tenderness or frontal sinus tenderness.      Mouth/Throat:      Lips: Pink.      Mouth: Mucous membranes are moist.      Pharynx: Posterior oropharyngeal erythema present.      Tonsils: No tonsillar exudate.   Eyes:      Conjunctiva/sclera: Conjunctivae normal.      Pupils: Pupils are equal, round, and reactive to light.   Neck:      " Musculoskeletal: Normal range of motion.   Cardiovascular:      Rate and Rhythm: Normal rate and regular rhythm.      Heart sounds: Normal heart sounds. No murmur.   Pulmonary:      Effort: Pulmonary effort is normal.      Breath sounds: Normal breath sounds. No wheezing.   Lymphadenopathy:      Cervical: No cervical adenopathy.   Skin:     General: Skin is warm and dry.      Capillary Refill: Capillary refill takes less than 2 seconds.   Neurological:      Mental Status: He is alert and oriented to person, place, and time.   Psychiatric:         Behavior: Behavior normal.         Judgment: Judgment normal.         POCT Rapid Strep A - Negative       Assessment/Plan:       1. Pharyngitis, unspecified etiology  - POCT Rapid Strep A  - Maalox Plus - Diphenhydramine - Lidocaine (MBX Oral Solution); Take 5 mL by mouth every 6 hours as needed for up to 7 days.  Dispense: 120 mL; Refill: 0    2. Subacute pansinusitis  - doxycycline (VIBRAMYCIN) 100 MG Tab; Take 1 Tab by mouth 2 times a day for 5 days.  Dispense: 10 Tab; Refill: 0      *Rapid strep is negative.  Patient has had symptoms for 1.5 months.  He is already using saline nasal spray as well as Flonase.  At this point recommend that he add on Zyrtec for 5 to 7 days and will also give him viscous lidocaine to use during this time.  If still no improvement, contingent prescription for doxycycline was provided for patient to fill.  Follow-up with PCP.      Differential Diagnosis, natural history, and supportive care discussed. Return to the Urgent Care or follow up with your PCP if symptoms fail to resolve, or for any new or worsening symptoms. Emergency room precautions discussed. Patient and/or family appears understanding of information.

## 2020-04-08 NOTE — TELEPHONE ENCOUNTER
1. Caller Name:  Venkata Funes                       Call Back Number: [phone] (home)     [facility] or [facility] Provider: Yes         2.  Does patient have any active symptoms of respiratory illness (fever OR cough OR shortness of breath OR sore throat)? Yes, the patient reports the following respiratory symptoms: sore throat and persistant clearing of throat, hoarseness. Difficulty swallowing on Left side of throat, has a hx of sinus problems and allergies  Has had s/s since since mid february    3.  Does patient have any comoribidities? None     4.  Has the patient traveled in the last 14 days OR had any known contact with someone who is suspected or confirmed to have COVID-19?  No.    5. Disposition: Advised to go to     Note routed to [facility] Provider: ARCHIE only.

## 2020-11-25 ENCOUNTER — TELEMEDICINE (OUTPATIENT)
Dept: MEDICAL GROUP | Facility: MEDICAL CENTER | Age: 60
End: 2020-11-25
Payer: COMMERCIAL

## 2020-11-25 VITALS — WEIGHT: 134 LBS | HEIGHT: 66 IN | HEART RATE: 62 BPM | BODY MASS INDEX: 21.53 KG/M2

## 2020-11-25 DIAGNOSIS — E78.5 DYSLIPIDEMIA: ICD-10-CM

## 2020-11-25 DIAGNOSIS — Z13.1 SCREENING FOR DIABETES MELLITUS: ICD-10-CM

## 2020-11-25 DIAGNOSIS — R36.1 HEMATOSPERMIA: ICD-10-CM

## 2020-11-25 DIAGNOSIS — J30.9 ALLERGIC RHINITIS, UNSPECIFIED SEASONALITY, UNSPECIFIED TRIGGER: ICD-10-CM

## 2020-11-25 PROCEDURE — 99214 OFFICE O/P EST MOD 30 MIN: CPT | Mod: 95,CR | Performed by: FAMILY MEDICINE

## 2020-11-25 NOTE — ASSESSMENT & PLAN NOTE
Patient comes in today describing a 9-month history of a scratchy, irritated throat with mucus production nonresponsive to Zyrtec, Flonase and a sinus rinse.  His symptoms are mild in severity but annoying to him.  It is worse in the morning when he first gets up.  He states that within the past several years he has seen an ENT who performed a laryngoscopy and CT scan of his sinuses.  He has a history of allergies and require immunotherapy in the past.

## 2020-11-25 NOTE — PROGRESS NOTES
Telemedicine Visit: Established Patient     This encounter was conducted via Zoom.   Verbal consent was obtained. Patient's identity was verified.    Subjective:     Venkata Funes is a 59 y.o. male presenting for evaluation and management of allergies.    Dyslipidemia  Patient has slight dyslipidemia identified on past labs.    Hematospermia  With past several months the patient has noticed occasional hematospermia.    Allergic rhinitis  Patient comes in today describing a 9-month history of a scratchy, irritated throat with mucus production nonresponsive to Zyrtec, Flonase and a sinus rinse.  His symptoms are mild in severity but annoying to him.  It is worse in the morning when he first gets up.  He states that within the past several years he has seen an ENT who performed a laryngoscopy and CT scan of his sinuses.  He has a history of allergies and require immunotherapy in the past.      ROS no fever or cough    No Known Allergies    Current medicines (including changes today)  Current Outpatient Medications   Medication Sig Dispense Refill   • TURMERIC PO Take 2 Caps by mouth every day.     • Omega-3 Fatty Acids (FISH OIL) 1200 MG Cap Take 2 Caps by mouth every day.     • Ascorbic Acid (VITAMIN C) 1000 MG Tab Take 1 Tab by mouth every day.     • Calcium-Magnesium-Vitamin D (CALCIUM MAGNESIUM PO) Take 1 Tab by mouth every day.     • Multiple Vitamin (MULTI-VITAMIN PO) Take  by mouth.     • ibuprofen (MOTRIN) 200 MG Tab Take 200 mg by mouth every 6 hours as needed.       No current facility-administered medications for this visit.        Patient Active Problem List    Diagnosis Date Noted   • Allergic rhinitis 11/25/2020   • Hematospermia 11/25/2020   • Seborrheic keratosis 11/12/2019   • Contact dermatitis 03/21/2019   • Chronic right shoulder pain 01/14/2019   • DONNIE (obstructive sleep apnea)-mild 01/10/2019   • Dyslipidemia 07/09/2018   • Healthcare maintenance 07/09/2018   • Orthostatic dizziness  "07/09/2018   • Sinusitis 05/09/2018   • Hair loss 05/09/2018   • Anxiety 05/09/2018       Family History   Problem Relation Age of Onset   • Depression Daughter    • Heart Failure Father    • Stroke Neg Hx        He  has a past medical history of Allergy, Anxiety, Back pain, Cataract, Chickenpox, and Sinusitis.  He  has a past surgical history that includes cataract extraction with iol; nasal septal reconstruction; eye surgery; and vasectomy.       Objective:   Vitals obtained by patient:  Pulse 62   Ht 1.676 m (5' 6\")   Wt 60.8 kg (134 lb)   BMI 21.63 kg/m²       Physical Exam:  Constitutional: Alert, no distress, well-groomed.  Skin: No rashes in visible areas.  Eye: Round. Conjunctiva clear, lids normal. No icterus.   ENMT: Lips pink without lesions, good dentition, moist mucous membranes. Phonation normal.  Neck: No masses, no thyromegaly. Moves freely without pain.  Respiratory: Unlabored respiratory effort, no cough or audible wheeze.  Psych: normal affect.      Assessment and Plan:     1. Allergic rhinitis, unspecified seasonality, unspecified trigger  This is most consistent with allergic rhinitis causing postnasal drip.  The patient seems to be doing everything he can at home and so I will refer him to an allergist to consider allergy testing.  I will examine him in 6 weeks when he follows up.  - REFERRAL TO ALLERGY    2. Dyslipidemia  - Lipid Profile; Future    3. Hematospermia  - PROSTATE SPECIFIC AG SCREENING; Future  - URINALYSIS,CULTURE IF INDICATED; Future    4. Screening for diabetes mellitus  - Basic Metabolic Panel; Future          Follow-up: Return if symptoms worsen or fail to improve.         "

## 2021-01-12 ENCOUNTER — OFFICE VISIT (OUTPATIENT)
Dept: MEDICAL GROUP | Facility: MEDICAL CENTER | Age: 61
End: 2021-01-12
Payer: COMMERCIAL

## 2021-01-12 VITALS
WEIGHT: 135 LBS | OXYGEN SATURATION: 98 % | TEMPERATURE: 97.7 F | DIASTOLIC BLOOD PRESSURE: 68 MMHG | HEIGHT: 67 IN | SYSTOLIC BLOOD PRESSURE: 112 MMHG | HEART RATE: 61 BPM | RESPIRATION RATE: 16 BRPM | BODY MASS INDEX: 21.19 KG/M2

## 2021-01-12 DIAGNOSIS — E78.5 DYSLIPIDEMIA: ICD-10-CM

## 2021-01-12 DIAGNOSIS — M25.522 LEFT ELBOW PAIN: ICD-10-CM

## 2021-01-12 DIAGNOSIS — J30.9 ALLERGIC RHINITIS, UNSPECIFIED SEASONALITY, UNSPECIFIED TRIGGER: ICD-10-CM

## 2021-01-12 DIAGNOSIS — L72.9 SUBCUTANEOUS CYST: ICD-10-CM

## 2021-01-12 DIAGNOSIS — M25.562 LEFT KNEE PAIN, UNSPECIFIED CHRONICITY: ICD-10-CM

## 2021-01-12 DIAGNOSIS — Z00.00 HEALTHCARE MAINTENANCE: ICD-10-CM

## 2021-01-12 DIAGNOSIS — R36.1 HEMATOSPERMIA: ICD-10-CM

## 2021-01-12 DIAGNOSIS — J32.0 CHRONIC MAXILLARY SINUSITIS: ICD-10-CM

## 2021-01-12 PROBLEM — R42 ORTHOSTATIC DIZZINESS: Status: RESOLVED | Noted: 2018-07-09 | Resolved: 2021-01-12

## 2021-01-12 PROCEDURE — 99214 OFFICE O/P EST MOD 30 MIN: CPT | Mod: 25 | Performed by: FAMILY MEDICINE

## 2021-01-12 PROCEDURE — 99396 PREV VISIT EST AGE 40-64: CPT | Performed by: FAMILY MEDICINE

## 2021-01-12 RX ORDER — NAPROXEN SODIUM 220 MG
220 TABLET ORAL 2 TIMES DAILY WITH MEALS
COMMUNITY

## 2021-01-12 ASSESSMENT — PATIENT HEALTH QUESTIONNAIRE - PHQ9: CLINICAL INTERPRETATION OF PHQ2 SCORE: 0

## 2021-01-12 NOTE — PROGRESS NOTES
"[facility]  Progress Note  Established Patient    Subjective:   Venkata Funes is a 60 y.o. male here today for a wellness visit. The patient is alone, both of us are masked.     Allergic rhinitis  Patient has complained of several months of a scratchy, irritated throat with mucus production nonresponsive to Zyrtec, Flonase, sinus rinse and limitedly responsive to Mucinex.  He had stated to me that he saw an ENT in the past.  At the last visit I referred him to allergy.  He has not yet made this appointment.    Dyslipidemia  Patient has a slight dyslipidemia with a 10-year ASCVD risk of 7.3%.  He is very conscientious about diet and exercise.    Hematospermia  This issue has resolved.  Urinalysis and PSA are normal.    Healthcare maintenance  Lipids: done 2021.  Fasting Glucose: Done 2021.  PSA: Done 2021 and normal.  Hepatitis C Screen: done 2018 and normal.   Colonoscopy: done 11/16/17 with 5 year recall.     Tdap: given 2012.     Left elbow pain  Patient describes left elbow pain.    Left knee pain  Patient describes left knee pain.    Sinusitis  See discussion regarding \"allergic rhinitis\".    Subcutaneous cyst  The patient gets massages.  His masseuse noted a subcutaneous lesion near the left shoulder blade.  It is not bothersome to the patient.      Current Outpatient Medications on File Prior to Visit   Medication Sig Dispense Refill   • diphenhydrAMINE-APAP, sleep, (TYLENOL PM EXTRA STRENGTH PO) Take 1 Tab by mouth every 3 days.     • naproxen (ALEVE) 220 MG tablet Take 220 mg by mouth 2 times a day, with meals.     • TURMERIC PO Take 2 Caps by mouth every day.     • Omega-3 Fatty Acids (FISH OIL) 1200 MG Cap Take 2 Caps by mouth every day.     • Ascorbic Acid (VITAMIN C) 1000 MG Tab Take 1 Tab by mouth every day.     • Calcium-Magnesium-Vitamin D (CALCIUM MAGNESIUM PO) Take 1 Tab by mouth every day.     • Multiple Vitamin (MULTI-VITAMIN PO) Take  by mouth.     • ibuprofen (MOTRIN) 200 MG Tab " "Take 200 mg by mouth every 6 hours as needed.       No current facility-administered medications on file prior to visit.        Past Medical History:   Diagnosis Date   • Allergy    • Anxiety    • Back pain    • Cataract    • Chickenpox    • Sinusitis        Allergies: Dairy food allergy and Other drug    Surgical History:  has a past surgical history that includes cataract extraction with iol; nasal septal reconstruction; vasectomy; and eye surgery (Left).    Family History: family history includes Depression in his daughter; Heart Failure in his father.    Social History:  reports that he has never smoked. He has never used smokeless tobacco. He reports current alcohol use of about 0.6 oz of alcohol per week. He reports that he does not use drugs.    ROS: No fever or cough.        Objective:     Vitals:    01/12/21 1010   BP: 112/68   BP Location: Left arm   Patient Position: Sitting   BP Cuff Size: Adult long   Pulse: 61   Resp: 16   Temp: 36.5 °C (97.7 °F)   TempSrc: Temporal   SpO2: 98%   Weight: 61.2 kg (135 lb)   Height: 1.702 m (5' 7\")       Physical Exam:  General: alert in no apparent distress.   Cardio: regular rate and rhythm, no murmurs, rubs or gallops.   Resp: CTAB no w/r/r.   ENMT: Briefly, the patient removed his mask.  There is no pharyngeal cobblestoning or tonsillar exudates.  Skin: There is a subcutaneous lesion measuring approximately 3 cm near the left scapula which is soft and movable.      Assessment and Plan:     1. Healthcare maintenance  - see HPI.   -Age-appropriate anticipatory guidance discussed including diet and exercise.  -Immunizations up-to-date.    2. Subcutaneous cyst  Suspect lipoma.  - US-EXTREMITY NON VASCULAR UNILATERAL LEFT; Future    3. Chronic maxillary sinusitis  -Follow-up allergy.    4. Left elbow pain  - REFERRAL TO SPORTS MEDICINE    5. Left knee pain, unspecified chronicity  - REFERRAL TO SPORTS MEDICINE    6. Allergic rhinitis, unspecified seasonality, unspecified " trigger  -Recommended he make an appointment to see allergy.    7. Dyslipidemia  -Diet and exercise.    8. Hematospermia  -Resolved, will monitor.        Followup: Return in about 1 year (around 1/12/2022), or if symptoms worsen or fail to improve, for Wellness Visit, Long.

## 2021-01-12 NOTE — ASSESSMENT & PLAN NOTE
Patient has a slight dyslipidemia with a 10-year ASCVD risk of 7.3%.  He is very conscientious about diet and exercise.

## 2021-01-12 NOTE — ASSESSMENT & PLAN NOTE
The patient gets massages.  His masseuse noted a subcutaneous lesion near the left shoulder blade.  It is not bothersome to the patient.

## 2021-01-12 NOTE — ASSESSMENT & PLAN NOTE
Patient has complained of several months of a scratchy, irritated throat with mucus production nonresponsive to Zyrtec, Flonase, sinus rinse and limitedly responsive to Mucinex.  He had stated to me that he saw an ENT in the past.  At the last visit I referred him to allergy.  He has not yet made this appointment.

## 2021-01-12 NOTE — ASSESSMENT & PLAN NOTE
Lipids: done 2021.  Fasting Glucose: Done 2021.  PSA: Done 2021 and normal.  Hepatitis C Screen: done 2018 and normal.   Colonoscopy: done 11/16/17 with 5 year recall.     Tdap: given 2012.

## 2021-01-19 ENCOUNTER — HOSPITAL ENCOUNTER (OUTPATIENT)
Dept: RADIOLOGY | Facility: MEDICAL CENTER | Age: 61
End: 2021-01-19
Attending: FAMILY MEDICINE
Payer: COMMERCIAL

## 2021-01-19 DIAGNOSIS — L72.9 SUBCUTANEOUS CYST: ICD-10-CM

## 2021-01-19 PROCEDURE — 76882 US LMTD JT/FCL EVL NVASC XTR: CPT | Mod: LT

## 2021-01-25 SDOH — ECONOMIC STABILITY: TRANSPORTATION INSECURITY
IN THE PAST 12 MONTHS, HAS THE LACK OF TRANSPORTATION KEPT YOU FROM MEDICAL APPOINTMENTS OR FROM GETTING MEDICATIONS?: NO

## 2021-01-25 SDOH — HEALTH STABILITY: PHYSICAL HEALTH: ON AVERAGE, HOW MANY MINUTES DO YOU ENGAGE IN EXERCISE AT THIS LEVEL?: 90 MINUTES

## 2021-01-25 SDOH — ECONOMIC STABILITY: HOUSING INSECURITY: IN THE LAST 12 MONTHS, HOW MANY PLACES HAVE YOU LIVED?: 1

## 2021-01-25 SDOH — ECONOMIC STABILITY: INCOME INSECURITY: IN THE LAST 12 MONTHS, WAS THERE A TIME WHEN YOU WERE NOT ABLE TO PAY THE MORTGAGE OR RENT ON TIME?: NO

## 2021-01-25 SDOH — ECONOMIC STABILITY: HOUSING INSECURITY
IN THE LAST 12 MONTHS, WAS THERE A TIME WHEN YOU DID NOT HAVE A STEADY PLACE TO SLEEP OR SLEPT IN A SHELTER (INCLUDING NOW)?: NO

## 2021-01-25 SDOH — ECONOMIC STABILITY: TRANSPORTATION INSECURITY
IN THE PAST 12 MONTHS, HAS LACK OF RELIABLE TRANSPORTATION KEPT YOU FROM MEDICAL APPOINTMENTS, MEETINGS, WORK OR FROM GETTING THINGS NEEDED FOR DAILY LIVING?: NO

## 2021-01-25 SDOH — HEALTH STABILITY: PHYSICAL HEALTH: ON AVERAGE, HOW MANY DAYS PER WEEK DO YOU ENGAGE IN MODERATE TO STRENUOUS EXERCISE (LIKE A BRISK WALK)?: 4 DAYS

## 2021-01-25 SDOH — HEALTH STABILITY: MENTAL HEALTH
STRESS IS WHEN SOMEONE FEELS TENSE, NERVOUS, ANXIOUS, OR CAN'T SLEEP AT NIGHT BECAUSE THEIR MIND IS TROUBLED. HOW STRESSED ARE YOU?: TO SOME EXTENT

## 2021-01-25 ASSESSMENT — SOCIAL DETERMINANTS OF HEALTH (SDOH)
WITHIN THE PAST 12 MONTHS, THE FOOD YOU BOUGHT JUST DIDN'T LAST AND YOU DIDN'T HAVE MONEY TO GET MORE: NEVER TRUE
WITHIN THE PAST 12 MONTHS, YOU WORRIED THAT YOUR FOOD WOULD RUN OUT BEFORE YOU GOT THE MONEY TO BUY MORE: NEVER TRUE
HOW OFTEN DO YOU HAVE A DRINK CONTAINING ALCOHOL: 4 OR MORE TIMES A WEEK
HOW OFTEN DO YOU GET TOGETHER WITH FRIENDS OR RELATIVES?: NEVER
HOW OFTEN DO YOU ATTEND CHURCH OR RELIGIOUS SERVICES?: NEVER
HOW OFTEN DO YOU ATTENT MEETINGS OF THE CLUB OR ORGANIZATION YOU BELONG TO?: NEVER
HOW MANY DRINKS CONTAINING ALCOHOL DO YOU HAVE ON A TYPICAL DAY WHEN YOU ARE DRINKING: 1 OR 2
IN A TYPICAL WEEK, HOW MANY TIMES DO YOU TALK ON THE PHONE WITH FAMILY, FRIENDS, OR NEIGHBORS?: TWICE A WEEK
HOW HARD IS IT FOR YOU TO PAY FOR THE VERY BASICS LIKE FOOD, HOUSING, MEDICAL CARE, AND HEATING?: NOT HARD AT ALL
HOW OFTEN DO YOU HAVE SIX OR MORE DRINKS ON ONE OCCASION: NEVER
DO YOU BELONG TO ANY CLUBS OR ORGANIZATIONS SUCH AS CHURCH GROUPS UNIONS, FRATERNAL OR ATHLETIC GROUPS, OR SCHOOL GROUPS?: NO

## 2021-01-26 ENCOUNTER — APPOINTMENT (OUTPATIENT)
Dept: RADIOLOGY | Facility: IMAGING CENTER | Age: 61
End: 2021-01-26
Attending: FAMILY MEDICINE
Payer: COMMERCIAL

## 2021-01-26 ENCOUNTER — OFFICE VISIT (OUTPATIENT)
Dept: MEDICAL GROUP | Facility: CLINIC | Age: 61
End: 2021-01-26
Payer: COMMERCIAL

## 2021-01-26 VITALS
HEART RATE: 64 BPM | SYSTOLIC BLOOD PRESSURE: 118 MMHG | WEIGHT: 135 LBS | BODY MASS INDEX: 21.19 KG/M2 | DIASTOLIC BLOOD PRESSURE: 80 MMHG | OXYGEN SATURATION: 95 % | TEMPERATURE: 98.7 F | HEIGHT: 67 IN | RESPIRATION RATE: 16 BRPM

## 2021-01-26 DIAGNOSIS — M25.562 LEFT MEDIAL KNEE PAIN: ICD-10-CM

## 2021-01-26 DIAGNOSIS — R29.898 WEAKNESS OF BOTH HIPS: ICD-10-CM

## 2021-01-26 PROCEDURE — 99203 OFFICE O/P NEW LOW 30 MIN: CPT | Performed by: FAMILY MEDICINE

## 2021-01-26 PROCEDURE — 73562 X-RAY EXAM OF KNEE 3: CPT | Mod: TC,LT | Performed by: FAMILY MEDICINE

## 2021-01-26 NOTE — PROGRESS NOTES
CHIEF COMPLAINT:  Venkata Funes male presenting at the request of Kunal Quach M.D.  for evaluation of knee pain.     Venkata Funes is complaining of left knee pain  present for 2 years  Pain is at the medial  knee  Quality is aching and feels weak, worse in AM  Pain is non-radiating   Improved with movement   Aggravated by getting up after sitting long periods and pivoting on the LEFT leg previous RIGHT hamstring injury, stretches RIGHT with LEFT knee in flexion   Prior Treatments: seen by PCP  Prior studies: NO Prior imaging has been done   Medications tried for pain include: ibuprofen (OTC) which does not help much  Mechanical Symptom history: No Locking    He was also having issue with his LEFT elbow  Fortunately, his left elbow pain is RESOLVED  He was moving the elbow and felt a pop and has had relief in the symptoms since    Retired, medical device sales  Likes running 3 miles, 3-4 times/week and mountain biking    REVIEW OF SYSTEMS  No Nausea, No Vomiting, No Chest Pain, No Shortness of Breath, No Dizziness, No Headache    PAST MEDICAL HISTORY:   History reviewed. No pertinent past medical history.    PMH:  has a past medical history of Allergy, Anxiety, Back pain, Cataract, Chickenpox, and Sinusitis.  MEDS:   Current Outpatient Medications:   •  diphenhydrAMINE-APAP, sleep, (TYLENOL PM EXTRA STRENGTH PO), Take 1 Tab by mouth every 3 days., Disp: , Rfl:   •  naproxen (ALEVE) 220 MG tablet, Take 220 mg by mouth 2 times a day, with meals., Disp: , Rfl:   •  TURMERIC PO, Take 2 Caps by mouth every day., Disp: , Rfl:   •  Omega-3 Fatty Acids (FISH OIL) 1200 MG Cap, Take 2 Caps by mouth every day., Disp: , Rfl:   •  Ascorbic Acid (VITAMIN C) 1000 MG Tab, Take 1 Tab by mouth every day., Disp: , Rfl:   •  Calcium-Magnesium-Vitamin D (CALCIUM MAGNESIUM PO), Take 1 Tab by mouth every day., Disp: , Rfl:   •  Multiple Vitamin (MULTI-VITAMIN PO), Take  by mouth., Disp: , Rfl:   •  ibuprofen (MOTRIN)  "200 MG Tab, Take 200 mg by mouth every 6 hours as needed., Disp: , Rfl:   ALLERGIES:   Allergies   Allergen Reactions   • Dairy Food Allergy      bloat ness    • Other Drug      Clams, mussels, oysters; diarrhea     SURGHX:   Past Surgical History:   Procedure Laterality Date   • CATARACT EXTRACTION WITH IOL     • EYE SURGERY Left     lasik eye surgery   • NASAL SEPTAL RECONSTRUCTION     • VASECTOMY       SOCHX:  reports that he has never smoked. He has never used smokeless tobacco. He reports current alcohol use of about 0.6 oz of alcohol per week. He reports that he does not use drugs.  FH: Family history was reviewed, no pertinent findings to report     PHYSICAL EXAM:  /80 (BP Location: Right arm, Patient Position: Sitting, BP Cuff Size: Adult)   Pulse 64   Temp 37.1 °C (98.7 °F) (Temporal)   Resp 16   Ht 1.702 m (5' 7\")   Wt 61.2 kg (135 lb)   SpO2 95%   BMI 21.14 kg/m²      well-developed, well-nourished in no apparent distress, alert and oriented x 3.  Gait: normal     RIGHT Knee:  Slight Varus and No Swelling  Range of Motion Intact  Trace effusion  Patellar No tenderness and no apprehension  Medial Joint Line Tenderness and NEGATIVE Sandip  Lateral Joint Line Non-tender and NEGATIVE Sandip  Trace Laxity with Varus stress  Trace Laxity with Valgus stress  Lachman's testing is Trace  Posterior Drawer Testing is Trace  The leg is otherwise neurovascularly intact    LEFT Knee:  Slight Varus and No Swelling   Range of Motion Intact  Trace effusion  Patellar No tenderness and no apprehension  Medial Joint Line Non-tender and NEGATIVE Sandip  Lateral Joint Line Non-tender and NEGATIVE Sandip  Trace Laxity with Varus stress  Trace Laxity with Valgus stress  Lachman's testing is Trace  Posterior Drawer Testing is Trace  The leg is otherwise neurovascularly intact    Additional Findings: POOR single-leg squat mechanics, with evident gluteal weakness bilaterally    1. Left medial knee pain  DX-KNEE " 3 VIEWS LEFT    DX-KNEES-AP BILATERAL STANDING   2. Weakness of both hips       present for 2 years  Pain is at the medial  knee  Quality is aching and feels weak, worse in AM    Provided with home exercise program  Recommend Pilates program for core strengthening, mostly hip  Recommend tunde chi for balance training    Since he likes to run, he is decreased proprioception on his LEFT side along with weak gluteal muscles is likely contributing to his knee pain    Follow-up as needed        1/26/2021 12:53 PM     HISTORY/REASON FOR EXAM:  Atraumatic Pain/Swelling/Deformity        TECHNIQUE/EXAM DESCRIPTION AND NUMBER OF VIEWS:  3 views of the LEFT knee.     COMPARISON: None     FINDINGS:  No fracture or dislocation is seen. There is spurring of the superior aspect of the patella. No joint effusion is identified.     IMPRESSION:     No evidence of acute fracture or dislocation.     Spurring of the superior aspect of the patella.    taken here and reviewed by me    Thank you Kunal Quach M.D. for allowing me to participate in caring for your patient.

## 2021-03-15 DIAGNOSIS — Z23 NEED FOR VACCINATION: ICD-10-CM

## 2021-03-17 ENCOUNTER — PATIENT MESSAGE (OUTPATIENT)
Dept: MEDICAL GROUP | Facility: MEDICAL CENTER | Age: 61
End: 2021-03-17

## 2021-03-17 DIAGNOSIS — J39.2 THROAT IRRITATION: ICD-10-CM

## 2021-03-23 ENCOUNTER — IMMUNIZATION (OUTPATIENT)
Dept: FAMILY PLANNING/WOMEN'S HEALTH CLINIC | Facility: IMMUNIZATION CENTER | Age: 61
End: 2021-03-23
Attending: INTERNAL MEDICINE
Payer: COMMERCIAL

## 2021-03-23 DIAGNOSIS — Z23 NEED FOR VACCINATION: ICD-10-CM

## 2021-03-23 DIAGNOSIS — Z23 ENCOUNTER FOR VACCINATION: Primary | ICD-10-CM

## 2021-03-23 PROCEDURE — 0001A PFIZER SARS-COV-2 VACCINE: CPT

## 2021-03-23 PROCEDURE — 91300 PFIZER SARS-COV-2 VACCINE: CPT

## 2021-04-15 ENCOUNTER — IMMUNIZATION (OUTPATIENT)
Dept: FAMILY PLANNING/WOMEN'S HEALTH CLINIC | Facility: IMMUNIZATION CENTER | Age: 61
End: 2021-04-15
Attending: INTERNAL MEDICINE
Payer: COMMERCIAL

## 2021-04-15 DIAGNOSIS — Z23 ENCOUNTER FOR VACCINATION: Primary | ICD-10-CM

## 2021-04-15 PROCEDURE — 0002A PFIZER SARS-COV-2 VACCINE: CPT | Performed by: INTERNAL MEDICINE

## 2021-04-15 PROCEDURE — 91300 PFIZER SARS-COV-2 VACCINE: CPT | Performed by: INTERNAL MEDICINE

## 2022-01-14 DIAGNOSIS — Z00.00 HEALTHCARE MAINTENANCE: ICD-10-CM

## 2022-01-19 ENCOUNTER — HOSPITAL ENCOUNTER (OUTPATIENT)
Dept: LAB | Facility: MEDICAL CENTER | Age: 62
End: 2022-01-19
Attending: FAMILY MEDICINE
Payer: COMMERCIAL

## 2022-01-19 DIAGNOSIS — Z00.00 HEALTHCARE MAINTENANCE: ICD-10-CM

## 2022-01-19 LAB
ALBUMIN SERPL BCP-MCNC: 4.2 G/DL (ref 3.2–4.9)
ALBUMIN/GLOB SERPL: 1.6 G/DL
ALP SERPL-CCNC: 94 U/L (ref 30–99)
ALT SERPL-CCNC: 19 U/L (ref 2–50)
ANION GAP SERPL CALC-SCNC: 6 MMOL/L (ref 7–16)
AST SERPL-CCNC: 23 U/L (ref 12–45)
BILIRUB SERPL-MCNC: 0.9 MG/DL (ref 0.1–1.5)
BUN SERPL-MCNC: 16 MG/DL (ref 8–22)
CALCIUM SERPL-MCNC: 8.8 MG/DL (ref 8.5–10.5)
CHLORIDE SERPL-SCNC: 103 MMOL/L (ref 96–112)
CHOLEST SERPL-MCNC: 191 MG/DL (ref 100–199)
CO2 SERPL-SCNC: 28 MMOL/L (ref 20–33)
CREAT SERPL-MCNC: 0.96 MG/DL (ref 0.5–1.4)
FASTING STATUS PATIENT QL REPORTED: NORMAL
GLOBULIN SER CALC-MCNC: 2.7 G/DL (ref 1.9–3.5)
GLUCOSE SERPL-MCNC: 90 MG/DL (ref 65–99)
HDLC SERPL-MCNC: 54 MG/DL
LDLC SERPL CALC-MCNC: 122 MG/DL
POTASSIUM SERPL-SCNC: 4.4 MMOL/L (ref 3.6–5.5)
PROT SERPL-MCNC: 6.9 G/DL (ref 6–8.2)
PSA SERPL-MCNC: 0.97 NG/ML (ref 0–4)
SODIUM SERPL-SCNC: 137 MMOL/L (ref 135–145)
TRIGL SERPL-MCNC: 77 MG/DL (ref 0–149)

## 2022-01-19 PROCEDURE — 80053 COMPREHEN METABOLIC PANEL: CPT

## 2022-01-19 PROCEDURE — 80061 LIPID PANEL: CPT

## 2022-01-19 PROCEDURE — 84153 ASSAY OF PSA TOTAL: CPT

## 2022-01-19 PROCEDURE — 36415 COLL VENOUS BLD VENIPUNCTURE: CPT

## 2022-02-02 ENCOUNTER — OFFICE VISIT (OUTPATIENT)
Dept: MEDICAL GROUP | Facility: MEDICAL CENTER | Age: 62
End: 2022-02-02
Payer: COMMERCIAL

## 2022-02-02 VITALS
BODY MASS INDEX: 20.88 KG/M2 | OXYGEN SATURATION: 97 % | HEIGHT: 67 IN | SYSTOLIC BLOOD PRESSURE: 102 MMHG | HEART RATE: 52 BPM | DIASTOLIC BLOOD PRESSURE: 60 MMHG | TEMPERATURE: 97.6 F | WEIGHT: 133 LBS | RESPIRATION RATE: 18 BRPM

## 2022-02-02 DIAGNOSIS — G47.33 OSA (OBSTRUCTIVE SLEEP APNEA): ICD-10-CM

## 2022-02-02 DIAGNOSIS — Z12.83 SKIN CANCER SCREENING: ICD-10-CM

## 2022-02-02 DIAGNOSIS — Z12.11 COLON CANCER SCREENING: ICD-10-CM

## 2022-02-02 DIAGNOSIS — Z00.00 HEALTHCARE MAINTENANCE: ICD-10-CM

## 2022-02-02 DIAGNOSIS — L72.9 SUBCUTANEOUS CYST: ICD-10-CM

## 2022-02-02 PROBLEM — G89.29 CHRONIC RIGHT SHOULDER PAIN: Status: RESOLVED | Noted: 2019-01-14 | Resolved: 2022-02-02

## 2022-02-02 PROBLEM — M25.511 CHRONIC RIGHT SHOULDER PAIN: Status: RESOLVED | Noted: 2019-01-14 | Resolved: 2022-02-02

## 2022-02-02 PROCEDURE — 99396 PREV VISIT EST AGE 40-64: CPT | Performed by: FAMILY MEDICINE

## 2022-02-02 ASSESSMENT — PATIENT HEALTH QUESTIONNAIRE - PHQ9: CLINICAL INTERPRETATION OF PHQ2 SCORE: 0

## 2022-02-02 NOTE — PROGRESS NOTES
"[facility]  Progress Note  Established Patient    Subjective:   Venkata Funes is a 61 y.o. male here today for a wellness visit. The patient is alone.     Healthcare maintenance  Lipids: done 2022.  Fasting Glucose: Done 2022.  PSA: Done 20222.  Hepatitis C Screen: done 2018 and normal.   Colonoscopy: done 11/16/17 with 5 year recall.    Tdap: given 2012.   Shingrix: completed .  COVID vaccine: completed.     DONNIE (obstructive sleep apnea)-mild  By report, elected not to treat. Interested in reconsidering CPAP.    Subcutaneous cyst  L scapular region, unchanged. US reassuring.      Current Outpatient Medications on File Prior to Visit   Medication Sig Dispense Refill   • diphenhydrAMINE-APAP, sleep, (TYLENOL PM EXTRA STRENGTH PO) Take 1 Tab by mouth every 3 days.     • naproxen (ALEVE) 220 MG tablet Take 220 mg by mouth 2 times a day, with meals.     • TURMERIC PO Take 2 Caps by mouth every day.     • Omega-3 Fatty Acids (FISH OIL) 1200 MG Cap Take 2 Caps by mouth every day.     • Ascorbic Acid (VITAMIN C) 1000 MG Tab Take 1 Tab by mouth every day.     • Calcium-Magnesium-Vitamin D (CALCIUM MAGNESIUM PO) Take 1 Tab by mouth every day.     • Multiple Vitamin (MULTI-VITAMIN PO) Take  by mouth.     • ibuprofen (MOTRIN) 200 MG Tab Take 200 mg by mouth every 6 hours as needed.       No current facility-administered medications on file prior to visit.          Objective:     Vitals:    02/02/22 0823   BP: 102/60   BP Location: Left arm   Patient Position: Sitting   BP Cuff Size: Adult long   Pulse: (!) 52   Resp: 18   Temp: 36.4 °C (97.6 °F)   TempSrc: Temporal   SpO2: 97%   Weight: 60.3 kg (133 lb)   Height: 1.702 m (5' 7\")       Physical Exam:  General: alert in no apparent distress.   Cardio: RRR.   Resp: CTAB.   Skin: benign appearing nevi on back, 4 cm soft, moveable L scapulas lipoma.         Assessment and Plan:     1. Colon cancer screening  - Referral to Gastroenterology    2. DONNIE (obstructive sleep " apnea)-mild  - Referral to Pulmonary and Sleep Medicine    3. Healthcare maintenance  - see HPI.   - age appropriate anticipatory guidance discussed.   - reviewed labs.   - immunizations UTD.     4. Skin cancer screening  - Referral to Dermatology    5. Subcutaneous cyst  - monitor.   - Referral to Dermatology        Followup: Return in about 1 year (around 2/2/2023), or if symptoms worsen or fail to improve, for Wellness Visit, Long.

## 2022-02-02 NOTE — ASSESSMENT & PLAN NOTE
Lipids: done 2022.  Fasting Glucose: Done 2022.  PSA: Done 20222.  Hepatitis C Screen: done 2018 and normal.   Colonoscopy: done 11/16/17 with 5 year recall.    Tdap: given 2012.   Shingrix: completed .  COVID vaccine: completed.

## 2022-03-20 ASSESSMENT — ENCOUNTER SYMPTOMS: SLEEP DISTURBANCE: 1

## 2022-03-22 ENCOUNTER — OFFICE VISIT (OUTPATIENT)
Dept: SLEEP MEDICINE | Facility: MEDICAL CENTER | Age: 62
End: 2022-03-22
Payer: COMMERCIAL

## 2022-03-22 VITALS
SYSTOLIC BLOOD PRESSURE: 110 MMHG | BODY MASS INDEX: 22.18 KG/M2 | HEIGHT: 66 IN | HEART RATE: 62 BPM | RESPIRATION RATE: 12 BRPM | OXYGEN SATURATION: 97 % | WEIGHT: 138 LBS | DIASTOLIC BLOOD PRESSURE: 62 MMHG

## 2022-03-22 DIAGNOSIS — G47.33 OSA (OBSTRUCTIVE SLEEP APNEA): Primary | ICD-10-CM

## 2022-03-22 PROCEDURE — 99203 OFFICE O/P NEW LOW 30 MIN: CPT | Performed by: STUDENT IN AN ORGANIZED HEALTH CARE EDUCATION/TRAINING PROGRAM

## 2022-03-22 NOTE — PATIENT INSTRUCTIONS
Sleep Apnea  Sleep apnea affects breathing during sleep. It causes breathing to stop for a short time or to become shallow. It can also increase the risk of:  · Heart attack.  · Stroke.  · Being very overweight (obese).  · Diabetes.  · Heart failure.  · Irregular heartbeat.  The goal of treatment is to help you breathe normally again.  What are the causes?  There are three kinds of sleep apnea:  · Obstructive sleep apnea. This is caused by a blocked or collapsed airway.  · Central sleep apnea. This happens when the brain does not send the right signals to the muscles that control breathing.  · Mixed sleep apnea. This is a combination of obstructive and central sleep apnea.  The most common cause of this condition is a collapsed or blocked airway. This can happen if:  · Your throat muscles are too relaxed.  · Your tongue and tonsils are too large.  · You are overweight.  · Your airway is too small.  What increases the risk?  · Being overweight.  · Smoking.  · Having a small airway.  · Being older.  · Being male.  · Drinking alcohol.  · Taking medicines to calm yourself (sedatives or tranquilizers).  · Having family members with the condition.  What are the signs or symptoms?  · Trouble staying asleep.  · Being sleepy or tired during the day.  · Getting angry a lot.  · Loud snoring.  · Headaches in the morning.  · Not being able to focus your mind (concentrate).  · Forgetting things.  · Less interest in sex.  · Mood swings.  · Personality changes.  · Feelings of sadness (depression).  · Waking up a lot during the night to pee (urinate).  · Dry mouth.  · Sore throat.  How is this diagnosed?  · Your medical history.  · A physical exam.  · A test that is done when you are sleeping (sleep study). The test is most often done in a sleep lab but may also be done at home.  How is this treated?    · Sleeping on your side.  · Using a medicine to get rid of mucus in your nose (decongestant).  · Avoiding the use of alcohol,  medicines to help you relax, or certain pain medicines (narcotics).  · Losing weight, if needed.  · Changing your diet.  · Not smoking.  · Using a machine to open your airway while you sleep, such as:  ? An oral appliance. This is a mouthpiece that shifts your lower jaw forward.  ? A CPAP device. This device blows air through a mask when you breathe out (exhale).  ? An EPAP device. This has valves that you put in each nostril.  ? A BPAP device. This device blows air through a mask when you breathe in (inhale) and breathe out.  · Having surgery if other treatments do not work.  It is important to get treatment for sleep apnea. Without treatment, it can lead to:  · High blood pressure.  · Coronary artery disease.  · In men, not being able to have an erection (impotence).  · Reduced thinking ability.  Follow these instructions at home:  Lifestyle  · Make changes that your doctor recommends.  · Eat a healthy diet.  · Lose weight if needed.  · Avoid alcohol, medicines to help you relax, and some pain medicines.  · Do not use any products that contain nicotine or tobacco, such as cigarettes, e-cigarettes, and chewing tobacco. If you need help quitting, ask your doctor.  General instructions  · Take over-the-counter and prescription medicines only as told by your doctor.  · If you were given a machine to use while you sleep, use it only as told by your doctor.  · If you are having surgery, make sure to tell your doctor you have sleep apnea. You may need to bring your device with you.  · Keep all follow-up visits as told by your doctor. This is important.  Contact a doctor if:  · The machine that you were given to use during sleep bothers you or does not seem to be working.  · You do not get better.  · You get worse.  Get help right away if:  · Your chest hurts.  · You have trouble breathing in enough air.  · You have an uncomfortable feeling in your back, arms, or stomach.  · You have trouble talking.  · One side of your  body feels weak.  · A part of your face is hanging down.  These symptoms may be an emergency. Do not wait to see if the symptoms will go away. Get medical help right away. Call your local emergency services (911 in the [place] Do not drive yourself to the hospital.  Summary  · This condition affects breathing during sleep.  · The most common cause is a collapsed or blocked airway.  · The goal of treatment is to help you breathe normally while you sleep.  This information is not intended to replace advice given to you by your health care provider. Make sure you discuss any questions you have with your health care provider.  Document Released: 09/26/2009 Document Revised: 10/04/2019 Document Reviewed: 08/13/2019  Elsevier Patient Education © 2020 [employer] Inc[employer]

## 2022-03-22 NOTE — PROGRESS NOTES
[facility] Consult Note     Date: 3/22/2022 / Time: 9:37 AM      Thank you for requesting a sleep medicine consultation on Venkata Funes at the sleep center. Presents today with the chief complaints of snoring, dry throat, and mucus production. He is referred by Kunal Quach M.D.  [address]  [address]  [place] [id number] for evaluation and treatment of obstructive sleep apnea.     HISTORY OF PRESENT ILLNESS:     Venkata Funes is a 61 y.o. male with Obstructive sleep apnea, nasal dryness, upper airway irritation.  Presents to Sleep Clinic for evaluation of nasal symptoms and history of obstructive sleep apnea.     He underwent a home sleep study in October 2018 through our office.  Study showed mild obstructive sleep apnea overall AHI of 10.7, minimum oxygen saturation of 86%.  Suggested he consider trying CPAP after the study however he denied it at that time.    He has had extensive history with difficulty breathing.  He has had multiple surgeries and procedures on his nose.  In addition he has followed with allergist who has suggested multiple different nasal sprays as well as allergy shots which have not significantly improved his symptoms.  He has seen ENT since his last surgery recommended no surgical intervention at that time.    He continues to find that his throat is dry and scratchy all the time.  Nothing seems to help this.  Happens throughout the day but seems to be worse at night when he lays down.  He does snore in his sleep and his wife is told him that this has persisted.  When he wakes up in the morning he is groggy for couple hours.  He can sleep 9 hours and not feel fully rested.  He is interested in trying CPAP.    As per supplemental questionnaire to be scanned or imported into chart:    Arden Sleepiness Score: 6    Sleep Schedule  Bedtime: Weekday & Weekend 10-1030pm  Wake time: Weekday & Weekend 8-9am  Sleep-onset latency: 5 min   Awakenings from sleep:  "1  Difficulty falling back asleep: none  Bedroom partner: yes  Naps: No     DAYTIME SYMPTOMS:   Excessive daytime sleepiness: No   Daytime fatigue: No   Difficulty concentrating: No   Memory problems: No   Irritability:No   Work/school performance issues: No   Sleepiness with driving: No   Caffeine/stimulant use: Yes 2 cups in morning   Alcohol use:Yes, How Many? 7 drinks a week      SLEEP RELATED SYMPTOMS  Snoring: Yes  Witnessed apnea or gasping/choking: No   Dry mouth or mouth breathing: Yes  Sweating: No   Teeth grinding/biting: Yes  Morning headaches: No   Refreshed Upon Awakening: No      SLEEP RELATED BEHAVIORS:  Parasomnias (walking, talking, eating, violence): No   Leg kicking: No   Restless legs - \"urge to move\": No   Nightmares: No  Recurrent: No   Dream enactment: No      NARCOLEPSY:  Cataplexy: No   Sleep paralysis: No   Sleep attacks: No   Hypnagogic/hypnopompic hallucinations: No     MEDICAL HISTORY  Past Medical History:   Diagnosis Date   • Allergic rhinitis    • Allergy    • Anxiety    • Back pain    • Cataract    • Chickenpox    • Herniated lumbar intervertebral disc    • Hoarseness, persistent    • Nasal drainage    • Painful joint    • Rhinitis    • Sinusitis    • Sore muscles    • Wears glasses         SURGICAL HISTORY  Past Surgical History:   Procedure Laterality Date   • CATARACT EXTRACTION WITH IOL     • EYE SURGERY Left     lasik eye surgery   • NASAL SEPTAL RECONSTRUCTION     • IN REMV 2ND CATARACT,CORN-SCLER SECTN     • SINUSCOPE     • VASECTOMY          FAMILY HISTORY  Family History   Problem Relation Age of Onset   • Depression Daughter    • Heart Failure Father    • Cancer Father         My father was successfully treated for prostrate cancer.   • Colon Cancer Father         My father had a cholectomy upon a diagnosis of Squamous cell carcinoma. The surgery led to a heart attack a day or two later while recovering in the hospital.   • Prostate cancer Father         My father was " "successfully treated for prostrate cancer.   • Alzheimer's Disease Maternal Grandmother    • Stroke Neg Hx        SOCIAL HISTORY  Social History     Socioeconomic History   • Marital status:    • Highest education level: Bachelor's degree (e.g., BA, AB, BS)   Tobacco Use   • Smoking status: Never Smoker   • Smokeless tobacco: Never Used   Vaping Use   • Vaping Use: Never used   Substance and Sexual Activity   • Alcohol use: Yes     Alcohol/week: 4.2 oz     Types: 7 Cans of beer per week     Comment: 1 beer most days. Non-alcoholic beer about half the time.   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     • naproxen (ANAPROX) 220 MG tablet Take 220 mg by mouth 2 times a day, with meals.     • TURMERIC PO Take 2 Caps by mouth every day.     • Omega-3 Fatty Acids (FISH OIL) 1200 MG Cap Take 2 Caps by mouth every day.     • Calcium-Magnesium-Vitamin D (CALCIUM MAGNESIUM PO) Take 1 Tab by mouth every day.     • Multiple Vitamin (MULTI-VITAMIN PO) Take  by mouth.     • ibuprofen (MOTRIN) 200 MG Tab Take 200 mg by mouth every 6 hours as needed.       No current facility-administered medications for this visit.       REVIEW OF SYSTEMS  Constitutional: Denies fevers, Denies weight changes  Ears/Nose/Throat/Mouth: Denies nasal congestion or sore throat   Cardiovascular: Denies chest pain  Respiratory: Denies shortness of breath, Denies cough  Gastrointestinal/Hepatic: Denies nausea, vomiting  Sleep: see HPI    Physical Examination:  Vitals/ General Appearance:   Weight/BMI: Body mass index is 22.27 kg/m².  /62 (BP Location: Left arm, Patient Position: Sitting, BP Cuff Size: Adult)   Pulse 62   Resp 12   Ht 1.676 m (5' 6\")   Wt 62.6 kg (138 lb)   " "SpO2 97%   Vitals:    03/22/22 0930   BP: 110/62   BP Location: Left arm   Patient Position: Sitting   BP Cuff Size: Adult   Pulse: 62   Resp: 12   SpO2: 97%   Weight: 62.6 kg (138 lb)   Height: 1.676 m (5' 6\")       Pt. is alert and oriented to time, place and person. Cooperative and in no apparent distress.     Constitutional: Alert, no distress, well-groomed.  Skin: No rashes in visible areas.  Eye: Round. Conjunctiva clear, lids normal. No icterus.   ENT EXAM  Nasal alae/valves collapsible: No   Nasal septum deviation: No   Nasal turbinate hypertrophy: Left: Grade 1   Right: Grade 1  Hard palate narrow: No   Hard palate high: No   Soft palate/uvula (Mallampati score): 2  Tongue Scalloping: No   Retrognathia: No   Micrognathia: No   Cardiovascular:no murmus/gallops/rubs, normal S1 and S2 heart sounds, regular rate and rhythm  Pulmonary:Clear to auscultation, No wheezes, No crackles.  Neurologic:Awake, alert and oriented x 3, Normal age appropriate gait, No involuntary motions.  Extremities: No clubbing, cyanosis, or edema       ASSESSMENT AND PLAN   Venkata Funes is a 61 y.o. male with Obstructive sleep apnea, nasal dryness, upper airway irritation.  Presents to Sleep Clinic for evaluation of nasal symptoms and history of obstructive sleep apnea.     1. Venkata Funes  Has Obstructive Sleep Apnea (DONNIE). Venkata Funes has symptoms of snoring, dry mouth, unrefreshed upon awakening. These interfere with activities of daily living.     The pathophysiology of DONNIE and the increased risk of cardiovascular morbidity from untreated DONNIE is discussed in detail with the patient.   Discussed the potential CPAP therapy may improve his nasal symptoms as well as having more refreshing sleep.  However does not guarantee.  His sleep study was last performed in 2018 will need to be repeated to get insurance coverage.    We have discussed diagnostic options including in-laboratory, attended polysomnography and home sleep " testing. We have also discussed treatment options including airway pressurization, reconstructive otolaryngologic surgery, dental appliances and weight management.     Subsequently,treatment options will be discussed based on the diagnostic study. Meanwhile, He is urged to avoid supine sleep, weight gain and alcoholic beverages since all of these can worsen DONNIE. He is cautioned against drowsy driving. If He feels sleepy while driving, advised must pull over for a break/nap, rather than persist on the road, in the interest of Pt's own safety and that of others on the road.    Plan  -  He  will be scheduled for an overnight HST to assess sleep related breathing disorder.  -We will notify via MyChart of results and order CPAP if indicated  -Advised to reach out via MyChart or by phone with any questions or concerns.     2.  Nasal dryness and mucus production.  Advised to consider following up with ENT.  May benefit from having scope in office to look at nasal passages in addition to upper airway.    Regarding treatment of other past medical problems and general health maintenance,  Pt is urged to follow up with PCP.      Please note portions of this record was created using voice recognition software. I have made every reasonable attempt to correct obvious errors, but I expect that there are errors of grammar and possibly content I did not discover before finalizing the note.

## 2022-04-20 ENCOUNTER — OFFICE VISIT (OUTPATIENT)
Dept: DERMATOLOGY | Facility: IMAGING CENTER | Age: 62
End: 2022-04-20
Payer: COMMERCIAL

## 2022-04-20 VITALS — WEIGHT: 134.25 LBS | BODY MASS INDEX: 21.57 KG/M2 | HEIGHT: 66 IN | TEMPERATURE: 98.8 F

## 2022-04-20 DIAGNOSIS — L82.1 SK (SEBORRHEIC KERATOSIS): ICD-10-CM

## 2022-04-20 DIAGNOSIS — D22.9 MULTIPLE NEVI: ICD-10-CM

## 2022-04-20 DIAGNOSIS — L81.4 LENTIGINES: ICD-10-CM

## 2022-04-20 DIAGNOSIS — D18.01 CHERRY ANGIOMA: ICD-10-CM

## 2022-04-20 DIAGNOSIS — L91.8 ACROCHORDON: ICD-10-CM

## 2022-04-20 DIAGNOSIS — Z12.83 SKIN CANCER SCREENING: ICD-10-CM

## 2022-04-20 PROCEDURE — 99213 OFFICE O/P EST LOW 20 MIN: CPT | Performed by: NURSE PRACTITIONER

## 2022-04-20 NOTE — PROGRESS NOTES
"DERMATOLOGY NOTE  NEW VISIT       Chief complaint: Establish Care (MICHAEL)     No previous exam    HPI/location: Back, reddish brown papule  Time present: 5 years  Painful lesion: No  Itching lesion: Yes  Enlarging lesion: No  Anything make it better or worse? no    History of skin cancer: No  History of precancers/actinic keratoses: Yes, Details: done by PCP, doesn't recall what they were  History of biopsies:No  History of blistering/severe sunburns:Yes, Details: lifetime sun exposure  Family history of skin cancer:Yes, Details: Father type unknown  Family history of atypical moles:No      Allergies   Allergen Reactions   • Dairy Food Allergy      bloat ness    • Other Drug      Clams, mussels, oysters; diarrhea        MEDICATIONS:  Medications relevant to specialty reviewed.     REVIEW OF SYSTEMS:   Positive for skin (see HPI)  Negative for fevers and chills       EXAM:  Temp 37.1 °C (98.8 °F)   Ht 1.676 m (5' 6\")   Wt 60.9 kg (134 lb 4 oz)   BMI 21.67 kg/m²   Constitutional: Well-developed, well-nourished, and in no distress.     A total body skin exam was performed excluding the genitals per patient preference and including the following areas: head (including face), neck, chest, abdomen, groin/buttocks, back, bilateral upper extremities, and bilateral lower extremities with the following pertinent findings listed below and/or in assessment/plan.     -sun exposed skin of trunk and b/l upper, lower extremities and face with scattered clinically benign light brown reticulated macules all of which were morphologically similar and none of which were suspicious for skin cancer today on exam     light brown stuck-on waxy papule on the trunk--mid upper back    Multiple tan medium and dark brown skin-colored macules papules scattered over the trunk, face and extremities, All with benign-appearing pigment network patterns on dermoscopy    Several scattered 1-3mm bright red macules and thin papules on the trunk     1-2mm " skin-colored to hyperpigmented, soft, pedunculated papule L axilla      IMPRESSION / PLAN:    1. Multiple nevi  - Benign-appearing nature of lesions discussed during exam.   - Advised to continue to monitor for any return to clinic for new or concerning changes.  - ABCDE's of melanoma discussed      2. SK (seborrheic keratosis)  - Benign-appearing nature of lesions discussed during exam.   - Advised to continue to monitor for any return to clinic for new or concerning changes.      3. Lentigines  - Benign-appearing nature of lesions discussed during exam.   - Advised to continue to monitor for any return to clinic for new or concerning changes.      4. Cherry angioma  - Benign-appearing nature of lesions discussed during exam.   - Advised to continue to monitor for any return to clinic for new or concerning changes.      5. Acrochordon  - Benign-appearing nature of lesions discussed during exam.   - Advised to continue to monitor for any return to clinic for new or concerning changes.      6. Skin cancer screening  Skin cancer education  - discussed importance of sun protective clothing, eyewear in addition to the use of broad spectrum sunscreen with SPF 30 or greater, as well as need for reapplication ~every 2 hours when exposed to UVR  - discussed importance following up for any new or changing lesions as noted in handout given, but every 12 months exams in clinic in the setting of dermatologic history  - ABCDE's of melanoma discussed/handout given           Please note that this dictation was created using voice recognition software. I have made every reasonable attempt to correct obvious errors, but I expect that there are errors of grammar and possibly content that I did not discover before finalizing the note.      Return to clinic in: Return in about 1 year (around 4/20/2023) for MICHAEL. and as needed for any new or changing skin lesions.

## 2022-05-11 ENCOUNTER — HOME STUDY (OUTPATIENT)
Dept: SLEEP MEDICINE | Facility: MEDICAL CENTER | Age: 62
End: 2022-05-11
Attending: STUDENT IN AN ORGANIZED HEALTH CARE EDUCATION/TRAINING PROGRAM
Payer: COMMERCIAL

## 2022-05-11 DIAGNOSIS — G47.33 OSA (OBSTRUCTIVE SLEEP APNEA): ICD-10-CM

## 2022-05-11 PROCEDURE — 95806 SLEEP STUDY UNATT&RESP EFFT: CPT | Performed by: STUDENT IN AN ORGANIZED HEALTH CARE EDUCATION/TRAINING PROGRAM

## 2022-05-13 ENCOUNTER — PATIENT MESSAGE (OUTPATIENT)
Dept: SLEEP MEDICINE | Facility: MEDICAL CENTER | Age: 62
End: 2022-05-13
Payer: COMMERCIAL

## 2022-05-13 DIAGNOSIS — G47.33 OSA (OBSTRUCTIVE SLEEP APNEA): Primary | ICD-10-CM

## 2022-05-13 NOTE — PROCEDURES
DIAGNOSTIC HOME SLEEP TEST (HST) REPORT       PATIENT ID:  NAME:  Venkata Funes  MRN:               [id number]  YOB: 1960  DATE OF STUDY: 05/11/2022      Impression:     This study shows evidence of:     1. Mild obstructive sleep apnea with 4% Respiratory Event Index (NEELA) of 12.9 per hour and worse in supine sleep with NEELA at 26.7. These findings are based on the recording time (flow evaluation time). It is not possible with this device to determine a traditional apnea+hypopnea index (AHI) for total sleep time since EEG channels are not available.     2. Oxygenation O2 Sat. nicole was 85% and mean O2 sat was 92% and baseline O2 at 96 %. O2 sat was below 88% for 3 min of the flow evaluation time. Oxygen Desaturation (>= 4%) Index was elevated at 13.0/hr. AVG HR was 58 BPM.      TECHNICAL DESCRIPTION: TUNJI apnea link air device used was a type-III home study device. Home sleep study recording included: Airflow recording by nasal pressure transducer; Respiratory Effort by 1 RIP Band; Oxygen Saturation and heart rate by finger oximetry. A position sensor and a snore channel was also used.    Scoring Criteria: A modification of the the AASM Manual for the Scoring of Sleep and Associated Events, 2012, was used.   Obstructive apnea was scored by cessation of airflow for at least 10 seconds with continuing respiratory effort.  Central apnea was scored by cessation of airflow for at least 10 seconds with no effort.  Hypopnea was scored by a 30% or more reduction in airflow for at least 10 seconds accompanied by an arterial oxygen desaturation of 3% or more.  (For Medicare patients, hypopneas were scored by a 30% or more reduction in airflow for at least 10 seconds accompanied by an arterial oxygen saturation of 4% or more, as required by their insurance, CMS.)    General sleep summary: . Total recording time is 8 hours and 59 minutes and total flow evaluation time is 8 hours and 47 minutes. The  patient spent 2 hours and 55 minutes in the supine position and 5 hours and 52 minutes in the nonsupine position.    Respiratory events:    Apneas: Total 27 (Obstructive apnea index 0.8/hr, Central apnea index 2.3 /hr, mixed 0 /hour)  Hypopneas: Total 86 with a Hypopnea index of 9.8 /hr    Recommendations:    1. CPAP titration study vs Auto CPAP trial.   2.   In general patients with sleep apnea are advised to avoid alcohol and sedatives and to not operate a motor vehicle while drowsy. In some cases alternative treatment options may prove effective in resolving sleep apnea in these options include upper airway surgery, the use of a dental orthotic or weight loss and positional therapy. Clinical correlation is required.         Mikal Puga MD

## 2022-05-17 ENCOUNTER — OFFICE VISIT (OUTPATIENT)
Dept: MEDICAL GROUP | Facility: MEDICAL CENTER | Age: 62
End: 2022-05-17
Payer: COMMERCIAL

## 2022-05-17 VITALS
OXYGEN SATURATION: 97 % | BODY MASS INDEX: 21.01 KG/M2 | HEART RATE: 61 BPM | HEIGHT: 66 IN | TEMPERATURE: 97.9 F | DIASTOLIC BLOOD PRESSURE: 60 MMHG | RESPIRATION RATE: 20 BRPM | SYSTOLIC BLOOD PRESSURE: 108 MMHG | WEIGHT: 130.73 LBS

## 2022-05-17 DIAGNOSIS — M67.40 GANGLION CYST: ICD-10-CM

## 2022-05-17 PROCEDURE — 99213 OFFICE O/P EST LOW 20 MIN: CPT | Performed by: FAMILY MEDICINE

## 2022-05-17 NOTE — PROGRESS NOTES
"[facility]  Progress Note  Established Patient    Subjective:   Venkata Funes is a 61 y.o. male here today with a chief complaint of cyst. The patient is alone.     Ganglion cyst  Ever since the patient was a teenager he had a cystic lesion on the left dorsal hand after trauma.  This has been a firm nodule but about a week ago it became more swollen.  It is uncomfortable for him but there is no associated motor dysfunction or paresthesia.      Current Outpatient Medications on File Prior to Visit   Medication Sig Dispense Refill   • diphenhydrAMINE-APAP, sleep, (TYLENOL PM EXTRA STRENGTH PO) Take 1 Tab by mouth every 3 days.     • naproxen (ANAPROX) 220 MG tablet Take 220 mg by mouth 2 times a day, with meals.     • TURMERIC PO Take 2 Caps by mouth every day.     • Omega-3 Fatty Acids (FISH OIL) 1200 MG Cap Take 2 Caps by mouth every day.     • Calcium-Magnesium-Vitamin D (CALCIUM MAGNESIUM PO) Take 1 Tab by mouth every day.     • Multiple Vitamin (MULTI-VITAMIN PO) Take  by mouth.     • ibuprofen (MOTRIN) 200 MG Tab Take 200 mg by mouth every 6 hours as needed.       No current facility-administered medications on file prior to visit.          Objective:     Vitals:    05/17/22 1503   BP: 108/60   BP Location: Left arm   Patient Position: Sitting   BP Cuff Size: Adult long   Pulse: 61   Resp: 20   Temp: 36.6 °C (97.9 °F)   TempSrc: Temporal   SpO2: 97%   Weight: 59.3 kg (130 lb 11.7 oz)   Height: 1.676 m (5' 6\")       Physical Exam:  General: alert in no apparent distress.   MSK: On the left dorsal hand just distal to the wrist is a soft and movable subcutaneous nodule that measures approximately 2 cm in size.  There is no associated redness, tenderness or warmth.  Sensation is intact in the fingers of the affected hand with 2+ radial pulses on the left hand.  Motor function intact in the left hand.  The lesion does transilluminate.  There is a blood vessel overlying it.        Assessment and Plan: "     1. Ganglion cyst  This is consistent with a ganglion cyst.  Will refer to hand surgery for further evaluation and treatment.  I advised the patient to proceed to [facility] immediately if he develops any paresthesias, motor dysfunction, redness, warmth.        Followup: Return if symptoms worsen or fail to improve.

## 2022-05-17 NOTE — ASSESSMENT & PLAN NOTE
Ever since the patient was a teenager he had a cystic lesion on the left dorsal hand after trauma.  This has been a firm nodule but about a week ago it became more swollen.  It is uncomfortable for him but there is no associated motor dysfunction or paresthesia.

## 2022-07-26 SDOH — ECONOMIC STABILITY: FOOD INSECURITY: WITHIN THE PAST 12 MONTHS, YOU WORRIED THAT YOUR FOOD WOULD RUN OUT BEFORE YOU GOT MONEY TO BUY MORE.: NEVER TRUE

## 2022-07-26 SDOH — ECONOMIC STABILITY: INCOME INSECURITY: HOW HARD IS IT FOR YOU TO PAY FOR THE VERY BASICS LIKE FOOD, HOUSING, MEDICAL CARE, AND HEATING?: NOT HARD AT ALL

## 2022-07-26 SDOH — ECONOMIC STABILITY: INCOME INSECURITY: IN THE LAST 12 MONTHS, WAS THERE A TIME WHEN YOU WERE NOT ABLE TO PAY THE MORTGAGE OR RENT ON TIME?: NO

## 2022-07-26 SDOH — HEALTH STABILITY: PHYSICAL HEALTH: ON AVERAGE, HOW MANY MINUTES DO YOU ENGAGE IN EXERCISE AT THIS LEVEL?: 70 MIN

## 2022-07-26 SDOH — ECONOMIC STABILITY: HOUSING INSECURITY: IN THE LAST 12 MONTHS, HOW MANY PLACES HAVE YOU LIVED?: 1

## 2022-07-26 SDOH — HEALTH STABILITY: MENTAL HEALTH
STRESS IS WHEN SOMEONE FEELS TENSE, NERVOUS, ANXIOUS, OR CAN'T SLEEP AT NIGHT BECAUSE THEIR MIND IS TROUBLED. HOW STRESSED ARE YOU?: NOT AT ALL

## 2022-07-26 SDOH — ECONOMIC STABILITY: FOOD INSECURITY: WITHIN THE PAST 12 MONTHS, THE FOOD YOU BOUGHT JUST DIDN'T LAST AND YOU DIDN'T HAVE MONEY TO GET MORE.: NEVER TRUE

## 2022-07-26 SDOH — HEALTH STABILITY: PHYSICAL HEALTH: ON AVERAGE, HOW MANY DAYS PER WEEK DO YOU ENGAGE IN MODERATE TO STRENUOUS EXERCISE (LIKE A BRISK WALK)?: 4 DAYS

## 2022-07-26 SDOH — ECONOMIC STABILITY: TRANSPORTATION INSECURITY
IN THE PAST 12 MONTHS, HAS LACK OF TRANSPORTATION KEPT YOU FROM MEETINGS, WORK, OR FROM GETTING THINGS NEEDED FOR DAILY LIVING?: NO

## 2022-07-26 ASSESSMENT — LIFESTYLE VARIABLES
HOW OFTEN DO YOU HAVE A DRINK CONTAINING ALCOHOL: 2-3 TIMES A WEEK
SKIP TO QUESTIONS 9-10: 1
AUDIT-C TOTAL SCORE: 3
HOW MANY STANDARD DRINKS CONTAINING ALCOHOL DO YOU HAVE ON A TYPICAL DAY: 1 OR 2
HOW OFTEN DO YOU HAVE SIX OR MORE DRINKS ON ONE OCCASION: NEVER

## 2022-07-26 ASSESSMENT — SOCIAL DETERMINANTS OF HEALTH (SDOH)
IN A TYPICAL WEEK, HOW MANY TIMES DO YOU TALK ON THE PHONE WITH FAMILY, FRIENDS, OR NEIGHBORS?: THREE TIMES A WEEK
DO YOU BELONG TO ANY CLUBS OR ORGANIZATIONS SUCH AS CHURCH GROUPS UNIONS, FRATERNAL OR ATHLETIC GROUPS, OR SCHOOL GROUPS?: NO
HOW OFTEN DO YOU ATTEND CHURCH OR RELIGIOUS SERVICES?: NEVER
HOW OFTEN DO YOU HAVE SIX OR MORE DRINKS ON ONE OCCASION: NEVER
HOW OFTEN DO YOU GET TOGETHER WITH FRIENDS OR RELATIVES?: NEVER
HOW OFTEN DO YOU ATTENT MEETINGS OF THE CLUB OR ORGANIZATION YOU BELONG TO?: NEVER
WITHIN THE PAST 12 MONTHS, YOU WORRIED THAT YOUR FOOD WOULD RUN OUT BEFORE YOU GOT THE MONEY TO BUY MORE: NEVER TRUE
HOW OFTEN DO YOU GET TOGETHER WITH FRIENDS OR RELATIVES?: NEVER
HOW OFTEN DO YOU ATTENT MEETINGS OF THE CLUB OR ORGANIZATION YOU BELONG TO?: NEVER
HOW HARD IS IT FOR YOU TO PAY FOR THE VERY BASICS LIKE FOOD, HOUSING, MEDICAL CARE, AND HEATING?: NOT HARD AT ALL
IN A TYPICAL WEEK, HOW MANY TIMES DO YOU TALK ON THE PHONE WITH FAMILY, FRIENDS, OR NEIGHBORS?: THREE TIMES A WEEK
HOW MANY DRINKS CONTAINING ALCOHOL DO YOU HAVE ON A TYPICAL DAY WHEN YOU ARE DRINKING: 1 OR 2
HOW OFTEN DO YOU HAVE A DRINK CONTAINING ALCOHOL: 2-3 TIMES A WEEK
DO YOU BELONG TO ANY CLUBS OR ORGANIZATIONS SUCH AS CHURCH GROUPS UNIONS, FRATERNAL OR ATHLETIC GROUPS, OR SCHOOL GROUPS?: NO
HOW OFTEN DO YOU ATTEND CHURCH OR RELIGIOUS SERVICES?: NEVER

## 2022-07-28 ENCOUNTER — OFFICE VISIT (OUTPATIENT)
Dept: MEDICAL GROUP | Facility: LAB | Age: 62
End: 2022-07-28
Payer: COMMERCIAL

## 2022-07-28 VITALS
BODY MASS INDEX: 21.34 KG/M2 | DIASTOLIC BLOOD PRESSURE: 60 MMHG | HEIGHT: 66 IN | RESPIRATION RATE: 12 BRPM | HEART RATE: 70 BPM | OXYGEN SATURATION: 96 % | WEIGHT: 132.8 LBS | SYSTOLIC BLOOD PRESSURE: 100 MMHG | TEMPERATURE: 97.6 F

## 2022-07-28 DIAGNOSIS — M77.11 RIGHT TENNIS ELBOW: ICD-10-CM

## 2022-07-28 DIAGNOSIS — M67.40 GANGLION CYST: ICD-10-CM

## 2022-07-28 DIAGNOSIS — M72.2 PLANTAR FASCIITIS OF LEFT FOOT: ICD-10-CM

## 2022-07-28 PROCEDURE — 99213 OFFICE O/P EST LOW 20 MIN: CPT | Performed by: FAMILY MEDICINE

## 2022-07-28 NOTE — PROGRESS NOTES
Venkata Funes is a 61 y.o. male here to establish care and discuss plantar fasciitis, ganglion cyst, tendinitis.    HPI:      Very active with hockey, hiking, and biking.  Has had multiple different pains and injuries over the years.  Currently dealing with plantar fasciitis to the left foot, has had minimal improvement with stretching, icing, insoles.  Does have hiking trip to the office planned and looking to get this improved.  He also has some chronic right tennis elbow made worse with playing hockey, ganglion cyst to the dorsal aspect of the left hand that was drained by orthopedics but is since returned, this is nonpainful.    Current medicines (including changes today)  Current Outpatient Medications   Medication Sig Dispense Refill   • Nutritional Supplements (CREATINE) 750 MG Cap      • diphenhydrAMINE-APAP, sleep, (TYLENOL PM EXTRA STRENGTH PO) Take 1 Tab by mouth every 3 days.     • naproxen (ANAPROX) 220 MG tablet Take 220 mg by mouth 2 times a day, with meals.     • TURMERIC PO Take 2 Caps by mouth every day.     • Omega-3 Fatty Acids (FISH OIL) 1200 MG Cap Take 2 Caps by mouth every day.     • Calcium-Magnesium-Vitamin D (CALCIUM MAGNESIUM PO) Take 1 Tab by mouth every day.     • Multiple Vitamin (MULTI-VITAMIN PO) Take  by mouth.     • ibuprofen (MOTRIN) 200 MG Tab Take 200 mg by mouth every 6 hours as needed.       No current facility-administered medications for this visit.     He  has a past medical history of Allergic rhinitis, Allergy, Anxiety, Back pain, Cataract, Chickenpox, Herniated lumbar intervertebral disc, Hoarseness, persistent, Nasal drainage, Painful joint, Rhinitis, Sinusitis, Sore muscles, and Wears glasses.  He  has a past surgical history that includes cataract extraction with iol; nasal septal reconstruction; vasectomy; eye surgery (Left); pr remv 2nd cataract,corn-scler sectn; and sinuscope.  Social History     Tobacco Use   • Smoking status: Never Smoker   • Smokeless tobacco:  "Never Used   Vaping Use   • Vaping Use: Never used   Substance Use Topics   • Alcohol use: Yes     Alcohol/week: 4.2 oz     Types: 7 Cans of beer per week     Comment: 1 beer most days. Non-alcoholic beer about half the time.   • Drug use: No     Social History     Social History Narrative   • Not on file     Family History   Problem Relation Age of Onset   • Depression Daughter    • Heart Failure Father    • Cancer Father         My father was successfully treated for prostrate cancer.   • Colon Cancer Father         My father had a cholectomy upon a diagnosis of Squamous cell carcinoma. The surgery led to a heart attack a day or two later while recovering in the hospital.   • Prostate cancer Father         My father was successfully treated for prostrate cancer.   • Alzheimer's Disease Maternal Grandmother    • Stroke Neg Hx      Family Status   Relation Name Status   • Mary  Alive   • Mo  Alive   • Fa Venkata    • Sis  Alive   • Bro  Alive   • MGMo Julianne (Not Specified)   • Neg Hx  (Not Specified)       ROS  See HPI     Objective:     Physical Exam:  /60 (BP Location: Left arm, Patient Position: Sitting, BP Cuff Size: Adult)   Pulse 70   Temp 36.4 °C (97.6 °F)   Resp 12   Ht 1.676 m (5' 6\")   Wt 60.2 kg (132 lb 12.8 oz)   SpO2 96%  Body mass index is 21.43 kg/m².  Constitutional: Alert. Well appearing. No distress.  Skin: Warm, dry, good turgor, no visible rashes.  Eye: Equal, round and reactive to light, conjunctiva clear, lids normal.  ENMT: Moist mucous membranes. Normal dentition. Oropharynx clear.  Respiratory: Normal effort.   MSK: Tenderness to the plantar surface of the left foot, just distal to the calcaneus over the plantar fascia.  Ganglion cyst present to the dorsal aspect of the left hand.  Tenderness over the tendon insertion just distal to the right elbow lateral epicondyle.  Psych: Answers questions appropriately. Normal affect and mood.    Assessment and Plan:     1. Ganglion " cyst  Returned after aspiration by orthopedics, currently nonpainful.  He may discuss further treatment options with Ortho.    2. Plantar fasciitis of left foot  Minimal improvement with insoles, icing, stretching.  Discussed physical therapy or podiatry referral but he would for to hold off on this point will let us know if he would like referral sent in the future.    3. Right tennis elbow  Likely secondary to overuse with playing hockey.  Recommended activity modification, anti-inflammatories, ice, tennis elbow band.    Follow up: Return in about 1 year (around 7/28/2023).         PLEASE NOTE: This note was created using voice recognition software.

## 2022-08-29 ENCOUNTER — HOSPITAL ENCOUNTER (EMERGENCY)
Facility: MEDICAL CENTER | Age: 62
End: 2022-08-29
Attending: EMERGENCY MEDICINE
Payer: COMMERCIAL

## 2022-08-29 VITALS
WEIGHT: 136.91 LBS | OXYGEN SATURATION: 95 % | SYSTOLIC BLOOD PRESSURE: 122 MMHG | HEART RATE: 51 BPM | DIASTOLIC BLOOD PRESSURE: 80 MMHG | TEMPERATURE: 98.4 F | BODY MASS INDEX: 22 KG/M2 | RESPIRATION RATE: 16 BRPM | HEIGHT: 66 IN

## 2022-08-29 DIAGNOSIS — S09.90XA CLOSED HEAD INJURY, INITIAL ENCOUNTER: ICD-10-CM

## 2022-08-29 PROCEDURE — 99282 EMERGENCY DEPT VISIT SF MDM: CPT

## 2022-08-29 NOTE — ED PROVIDER NOTES
"ED Provider Note    CHIEF COMPLAINT  Chief Complaint   Patient presents with    T-5000 GLF     Fell ice skating at 0930  Hit back of helmeted head No LOC Denies neck pain  No N/V Just feels sluggish       HPI  Venkata Funes is a 61 y.o. male who presents with no significant past medical history, the patient was ice-skating wearing a helmet and he fell and hit the back of his head.  There was no loss of consciousness.  He denies any focal neurologic deficits.  He denies any neck or head pain.  He says he feels kind of \"sluggish\".  He denies taking any blood thinning medications, occasional ibuprofen only.  This head injury occurred at 9:30 AM.    REVIEW OF SYSTEMS  See HPI for further details. All other systems are negative.     PAST MEDICAL HISTORY   has a past medical history of Allergic rhinitis, Allergy, Anxiety, Back pain, Cataract, Chickenpox, Herniated lumbar intervertebral disc, Hoarseness, persistent, Nasal drainage, Painful joint, Rhinitis, Sinusitis, Sore muscles, and Wears glasses.    SOCIAL HISTORY  Social History     Tobacco Use    Smoking status: Never    Smokeless tobacco: Never   Vaping Use    Vaping Use: Never used   Substance and Sexual Activity    Alcohol use: Yes     Alcohol/week: 4.2 oz     Types: 7 Cans of beer per week     Comment: 1 beer most days. Non-alcoholic beer about half the time.    Drug use: No    Sexual activity: Yes     Partners: Female       SURGICAL HISTORY   has a past surgical history that includes cataract extraction with iol; nasal septal reconstruction; vasectomy; eye surgery (Left); remv 2nd cataract,corn-scler sectn; and sinuscope.    CURRENT MEDICATIONS    Nutritional Supplements (CREATINE) 750 MG Cap 3/1/2018    Class: Historical Med   NON SPECIFIED 6/1/2022    Sig: Tribulus terrestris extract   Class: Historical Med   NON SPECIFIED 1/1/2010    Sig: holy basil leaf extract   Class: Historical Med   GLUTAMINE PO     Sig: Take  by mouth.   Class: Historical Med   Route: " "Oral   NON SPECIFIED     Sig: BCAA Amino Acids   Class: Historical Med   diphenhydrAMINE-APAP, sleep, (TYLENOL PM EXTRA STRENGTH PO)     Sig: Take 1 Tab by mouth every 3 days.   Class: Historical Med   Route: Oral   naproxen (ANAPROX) 220 MG tablet     Sig: Take 220 mg by mouth 2 times a day, with meals.   Class: Historical Med   Route: Oral   Number of times this order has been changed since signin     Order Audit Kermit     TURMERIC PO     Sig: Take 2 Caps by mouth every day.   Class: Historical Med   Route: Oral   Omega-3 Fatty Acids (FISH OIL) 1200 MG Cap     Sig: Take 2 Caps by mouth every day.   Class: Historical Med   Route: Oral   Calcium-Magnesium-Vitamin D (CALCIUM MAGNESIUM PO)     Sig: Take 1 Tab by mouth every day.   Class: Historical Med   Route: Oral   Multiple Vitamin (MULTI-VITAMIN PO)     Sig: Take  by mouth.   Class: Historical Med   Route: Oral   ibuprofen (MOTRIN) 200 MG Tab     Sig: Take 200 mg by mouth every 6 hours as needed.   Class: Historical Med   Route: Oral       ALLERGIES  Allergies   Allergen Reactions    Dairy Food Allergy      bloat ness     Other Drug      Clams, mussels, oysters; diarrhea       FAMILY HISTORY  No pertinent family history    PHYSICAL EXAM  VITAL SIGNS: /56   Pulse (!) 59   Temp 36.6 °C (97.9 °F) (Temporal)   Resp 14   Ht 1.676 m (5' 6\")   Wt 62.1 kg (136 lb 14.5 oz)   SpO2 97%   BMI 22.10 kg/m²  @KLAUS[[id number]::@   Pulse ox interpretation: I interpret this pulse ox as normal.  Constitutional: Alert in no apparent distress.  HENT: No signs of trauma, Bilateral external ears normal, Nose normal.   Eyes: Pupils are equal and reactive, Conjunctiva normal, Non-icteric.   Neck: Normal range of motion, No tenderness, Supple, No stridor.   Lymphatic: No lymphadenopathy noted.   Cardiovascular: Regular rate and rhythm, no murmurs.   Thorax & Lungs: Normal breath sounds, No respiratory distress, No wheezing, No chest tenderness.   Abdomen: Bowel sounds normal, " Soft, No tenderness, No masses, No pulsatile masses. No peritoneal signs.  Skin: Warm, Dry, No erythema, No rash.   Back: No bony tenderness, No CVA tenderness.   Extremities: Intact distal pulses, No edema, No tenderness, No cyanosis.  Musculoskeletal: Good range of motion in all major joints. No tenderness to palpation or major deformities noted.   Neurologic: Alert , Normal motor function, Normal sensory function, No focal deficits noted.   Psychiatric: Affect normal, Judgment normal, Mood normal.             COURSE & MEDICAL DECISION MAKING  Pertinent Labs & Imaging studies reviewed. (See chart for details)    The patient presents status post head injury while wearing a helmet, he has no evidence of severe intracranial pathology.  He does not meet criteria for CT scan.  He is discharged with instructions on head injury, he will return for any worsening symptoms.  He will not sleep or be in a position to not know his mental status until 6 hours after the injury.    The patient will return for new or worsening symptoms and is stable at the time of discharge.    The patient is referred to a primary physician for blood pressure management, diabetic screening, and for all other preventative health concerns.      DISPOSITION:  Patient will be discharged home in stable condition.    FOLLOW UP:  [facility], Emergency Dept  [address]  [place] [address]  [phone]  Follow up  If symptoms worsen    Benjamin Archer M.D.  [address] [id number] [phone]    Follow up  As needed      OUTPATIENT MEDICATIONS:  New Prescriptions    No medications on file      The patient will return for worsening symptoms and is stable at the time of discharge. The patient verbalizes understanding and will comply.    FINAL IMPRESSION  1. Closed head injury, initial encounter                   Electronically signed by: Devan Kerr M.D., 8/29/2022 1:43 PM

## 2023-07-19 ENCOUNTER — HOSPITAL ENCOUNTER (OUTPATIENT)
Dept: LAB | Facility: MEDICAL CENTER | Age: 63
End: 2023-07-19
Attending: FAMILY MEDICINE
Payer: COMMERCIAL

## 2023-07-19 DIAGNOSIS — Z12.5 SCREENING FOR PROSTATE CANCER: ICD-10-CM

## 2023-07-19 DIAGNOSIS — Z00.00 WELL ADULT EXAM: ICD-10-CM

## 2023-07-19 LAB
BASOPHILS # BLD AUTO: 1.4 % (ref 0–1.8)
BASOPHILS # BLD: 0.06 K/UL (ref 0–0.12)
EOSINOPHIL # BLD AUTO: 0.1 K/UL (ref 0–0.51)
EOSINOPHIL NFR BLD: 2.3 % (ref 0–6.9)
ERYTHROCYTE [DISTWIDTH] IN BLOOD BY AUTOMATED COUNT: 43 FL (ref 35.9–50)
HCT VFR BLD AUTO: 45.8 % (ref 42–52)
HGB BLD-MCNC: 15 G/DL (ref 14–18)
IMM GRANULOCYTES # BLD AUTO: 0.01 K/UL (ref 0–0.11)
IMM GRANULOCYTES NFR BLD AUTO: 0.2 % (ref 0–0.9)
LYMPHOCYTES # BLD AUTO: 1.48 K/UL (ref 1–4.8)
LYMPHOCYTES NFR BLD: 34.4 % (ref 22–41)
MCH RBC QN AUTO: 29.9 PG (ref 27–33)
MCHC RBC AUTO-ENTMCNC: 32.8 G/DL (ref 32.3–36.5)
MCV RBC AUTO: 91.2 FL (ref 81.4–97.8)
MONOCYTES # BLD AUTO: 0.43 K/UL (ref 0–0.85)
MONOCYTES NFR BLD AUTO: 10 % (ref 0–13.4)
NEUTROPHILS # BLD AUTO: 2.22 K/UL (ref 1.82–7.42)
NEUTROPHILS NFR BLD: 51.7 % (ref 44–72)
NRBC # BLD AUTO: 0 K/UL
NRBC BLD-RTO: 0 /100 WBC (ref 0–0.2)
PLATELET # BLD AUTO: 218 K/UL (ref 164–446)
PMV BLD AUTO: 11.5 FL (ref 9–12.9)
PSA SERPL-MCNC: 1.34 NG/ML (ref 0–4)
RBC # BLD AUTO: 5.02 M/UL (ref 4.7–6.1)
WBC # BLD AUTO: 4.3 K/UL (ref 4.8–10.8)

## 2023-07-19 PROCEDURE — 36415 COLL VENOUS BLD VENIPUNCTURE: CPT

## 2023-07-19 PROCEDURE — 80061 LIPID PANEL: CPT

## 2023-07-19 PROCEDURE — 80053 COMPREHEN METABOLIC PANEL: CPT

## 2023-07-19 PROCEDURE — 85025 COMPLETE CBC W/AUTO DIFF WBC: CPT

## 2023-07-19 PROCEDURE — 84153 ASSAY OF PSA TOTAL: CPT

## 2023-07-20 LAB
ALBUMIN SERPL BCP-MCNC: 4.2 G/DL (ref 3.2–4.9)
ALBUMIN/GLOB SERPL: 1.4 G/DL
ALP SERPL-CCNC: 75 U/L (ref 30–99)
ALT SERPL-CCNC: 19 U/L (ref 2–50)
ANION GAP SERPL CALC-SCNC: 10 MMOL/L (ref 7–16)
AST SERPL-CCNC: 28 U/L (ref 12–45)
BILIRUB SERPL-MCNC: 0.8 MG/DL (ref 0.1–1.5)
BUN SERPL-MCNC: 17 MG/DL (ref 8–22)
CALCIUM ALBUM COR SERPL-MCNC: 9.1 MG/DL (ref 8.5–10.5)
CALCIUM SERPL-MCNC: 9.3 MG/DL (ref 8.5–10.5)
CHLORIDE SERPL-SCNC: 104 MMOL/L (ref 96–112)
CHOLEST SERPL-MCNC: 197 MG/DL (ref 100–199)
CO2 SERPL-SCNC: 27 MMOL/L (ref 20–33)
CREAT SERPL-MCNC: 0.96 MG/DL (ref 0.5–1.4)
FASTING STATUS PATIENT QL REPORTED: NORMAL
GFR SERPLBLD CREATININE-BSD FMLA CKD-EPI: 88 ML/MIN/1.73 M 2
GLOBULIN SER CALC-MCNC: 3 G/DL (ref 1.9–3.5)
GLUCOSE SERPL-MCNC: 81 MG/DL (ref 65–99)
HDLC SERPL-MCNC: 56 MG/DL
LDLC SERPL CALC-MCNC: 126 MG/DL
POTASSIUM SERPL-SCNC: 4.4 MMOL/L (ref 3.6–5.5)
PROT SERPL-MCNC: 7.2 G/DL (ref 6–8.2)
SODIUM SERPL-SCNC: 141 MMOL/L (ref 135–145)
TRIGL SERPL-MCNC: 77 MG/DL (ref 0–149)

## 2023-07-25 ENCOUNTER — OFFICE VISIT (OUTPATIENT)
Dept: DERMATOLOGY | Facility: IMAGING CENTER | Age: 63
End: 2023-07-25
Payer: COMMERCIAL

## 2023-07-25 DIAGNOSIS — D18.01 CHERRY ANGIOMA: ICD-10-CM

## 2023-07-25 DIAGNOSIS — Z12.83 SKIN CANCER SCREENING: ICD-10-CM

## 2023-07-25 DIAGNOSIS — D22.9 MULTIPLE NEVI: ICD-10-CM

## 2023-07-25 DIAGNOSIS — L82.1 SK (SEBORRHEIC KERATOSIS): ICD-10-CM

## 2023-07-25 DIAGNOSIS — L81.4 LENTIGINES: ICD-10-CM

## 2023-07-25 PROCEDURE — 99212 OFFICE O/P EST SF 10 MIN: CPT | Performed by: NURSE PRACTITIONER

## 2023-07-25 NOTE — PROGRESS NOTES
DERMATOLOGY NOTE  FOLLOW UP VISIT       Chief complaint: Follow-Up (MICHAEL)     HPI/location: Back  Time present: Unsure  Painful lesion: No  Itching lesion: No  Enlarging lesion: No      History of skin cancer: No  History of precancers/actinic keratoses: Unsure  History of biopsies:No  History of blistering/severe sunburns:Yes, Details: lifetime sun exposure  Family history of skin cancer:Yes, Details: Father type unknown  Family history of atypical moles:No      Allergies   Allergen Reactions    Dairy Food Allergy      bloat ness     Other Drug      Clams, mussels, oysters; diarrhea        MEDICATIONS:  Medications relevant to specialty reviewed.     REVIEW OF SYSTEMS:   Positive for skin (see HPI)  Negative for fevers and chills       EXAM:  There were no vitals taken for this visit.  Constitutional: Well-developed, well-nourished, and in no distress.     A total body skin exam was performed excluding the genitals per patient preference and including the following areas: head (including face), neck, chest, abdomen, groin/buttocks, back, bilateral upper extremities, and bilateral lower extremities with the following pertinent findings listed below and/or in assessment/plan.     -sun exposed skin of trunk and b/l upper, lower extremities and face with scattered clinically benign light brown reticulated macules all of which were morphologically similar and none of which were suspicious for skin cancer today on exam    Several tan light brown stuck-on waxy papules scattered on the trunk and extremities      -Multiple tan medium and dark brown skin-colored macules papules scattered over the trunk, face and extremities, All with benign-appearing pigment network patterns on dermoscopy    -Several scattered 1-3mm bright red macules and thin papules on the trunk and extremities    IMPRESSION / PLAN:      1. Lentigines  - Benign-appearing nature of lesions discussed during exam.   - Advised to continue to monitor for any  return to clinic for new or concerning changes.      2. Cherry angioma  - Benign-appearing nature of lesions discussed during exam.   - Advised to continue to monitor for any return to clinic for new or concerning changes.      3. Multiple nevi  - Benign-appearing nature of lesions discussed during exam.   - Advised to continue to monitor for any return to clinic for new or concerning changes.  - ABCDE's of melanoma discussed/handout given      4. SK (seborrheic keratosis)  - Benign-appearing nature of lesions discussed during exam.   - Advised to continue to monitor for any return to clinic for new or concerning changes.    5. Skin cancer screening  Skin cancer education  discussed importance of sun protective clothing, eyewear in addition to the use of broad spectrum sunscreen with SPF 30 or greater, as well as need for reapplication ~every 2 hours when exposed to UVR/handout given  discussed importance following up for any new or changing lesions as noted in handout given, but every 12 months exams in clinic in the setting of dermatologic history  ABCDE's of melanoma discussed/handout given         I have performed a physical exam and reviewed and updated ROS and Plan today (7/25/2023). In review of dermatology visit (4/20/2022), there are no changes except as documented above.     Please note that this dictation was created using voice recognition software. I have made every reasonable attempt to correct obvious errors, but I expect that there are errors of grammar and possibly content that I did not discover before finalizing the note.      Return to clinic in: Return in about 1 year (around 7/25/2024) for MICHAEL. and as needed for any new or changing skin lesions.

## 2023-07-29 SDOH — HEALTH STABILITY: PHYSICAL HEALTH: ON AVERAGE, HOW MANY DAYS PER WEEK DO YOU ENGAGE IN MODERATE TO STRENUOUS EXERCISE (LIKE A BRISK WALK)?: 4 DAYS

## 2023-07-29 SDOH — ECONOMIC STABILITY: INCOME INSECURITY: IN THE LAST 12 MONTHS, WAS THERE A TIME WHEN YOU WERE NOT ABLE TO PAY THE MORTGAGE OR RENT ON TIME?: NO

## 2023-07-29 SDOH — ECONOMIC STABILITY: INCOME INSECURITY: HOW HARD IS IT FOR YOU TO PAY FOR THE VERY BASICS LIKE FOOD, HOUSING, MEDICAL CARE, AND HEATING?: NOT HARD AT ALL

## 2023-07-29 SDOH — ECONOMIC STABILITY: FOOD INSECURITY: WITHIN THE PAST 12 MONTHS, THE FOOD YOU BOUGHT JUST DIDN'T LAST AND YOU DIDN'T HAVE MONEY TO GET MORE.: NEVER TRUE

## 2023-07-29 SDOH — ECONOMIC STABILITY: HOUSING INSECURITY: IN THE LAST 12 MONTHS, HOW MANY PLACES HAVE YOU LIVED?: 1

## 2023-07-29 SDOH — HEALTH STABILITY: PHYSICAL HEALTH: ON AVERAGE, HOW MANY MINUTES DO YOU ENGAGE IN EXERCISE AT THIS LEVEL?: 60 MIN

## 2023-07-29 SDOH — ECONOMIC STABILITY: FOOD INSECURITY: WITHIN THE PAST 12 MONTHS, YOU WORRIED THAT YOUR FOOD WOULD RUN OUT BEFORE YOU GOT MONEY TO BUY MORE.: NEVER TRUE

## 2023-07-29 ASSESSMENT — SOCIAL DETERMINANTS OF HEALTH (SDOH)
IN A TYPICAL WEEK, HOW MANY TIMES DO YOU TALK ON THE PHONE WITH FAMILY, FRIENDS, OR NEIGHBORS?: ONCE A WEEK
HOW OFTEN DO YOU ATTEND CHURCH OR RELIGIOUS SERVICES?: NEVER
HOW MANY DRINKS CONTAINING ALCOHOL DO YOU HAVE ON A TYPICAL DAY WHEN YOU ARE DRINKING: 1 OR 2
WITHIN THE PAST 12 MONTHS, YOU WORRIED THAT YOUR FOOD WOULD RUN OUT BEFORE YOU GOT THE MONEY TO BUY MORE: NEVER TRUE
HOW OFTEN DO YOU ATTEND CHURCH OR RELIGIOUS SERVICES?: NEVER
HOW OFTEN DO YOU GET TOGETHER WITH FRIENDS OR RELATIVES?: NEVER
HOW OFTEN DO YOU HAVE A DRINK CONTAINING ALCOHOL: 4 OR MORE TIMES A WEEK
IN A TYPICAL WEEK, HOW MANY TIMES DO YOU TALK ON THE PHONE WITH FAMILY, FRIENDS, OR NEIGHBORS?: ONCE A WEEK
HOW OFTEN DO YOU GET TOGETHER WITH FRIENDS OR RELATIVES?: NEVER
HOW OFTEN DO YOU ATTENT MEETINGS OF THE CLUB OR ORGANIZATION YOU BELONG TO?: NEVER
HOW OFTEN DO YOU HAVE SIX OR MORE DRINKS ON ONE OCCASION: NEVER
DO YOU BELONG TO ANY CLUBS OR ORGANIZATIONS SUCH AS CHURCH GROUPS UNIONS, FRATERNAL OR ATHLETIC GROUPS, OR SCHOOL GROUPS?: NO
HOW OFTEN DO YOU ATTENT MEETINGS OF THE CLUB OR ORGANIZATION YOU BELONG TO?: NEVER
HOW HARD IS IT FOR YOU TO PAY FOR THE VERY BASICS LIKE FOOD, HOUSING, MEDICAL CARE, AND HEATING?: NOT HARD AT ALL
DO YOU BELONG TO ANY CLUBS OR ORGANIZATIONS SUCH AS CHURCH GROUPS UNIONS, FRATERNAL OR ATHLETIC GROUPS, OR SCHOOL GROUPS?: NO

## 2023-07-29 ASSESSMENT — LIFESTYLE VARIABLES
HOW OFTEN DO YOU HAVE SIX OR MORE DRINKS ON ONE OCCASION: NEVER
AUDIT-C TOTAL SCORE: 4
SKIP TO QUESTIONS 9-10: 1
HOW MANY STANDARD DRINKS CONTAINING ALCOHOL DO YOU HAVE ON A TYPICAL DAY: 1 OR 2
HOW OFTEN DO YOU HAVE A DRINK CONTAINING ALCOHOL: 4 OR MORE TIMES A WEEK

## 2023-08-01 ENCOUNTER — OFFICE VISIT (OUTPATIENT)
Dept: MEDICAL GROUP | Facility: LAB | Age: 63
End: 2023-08-01
Payer: COMMERCIAL

## 2023-08-01 VITALS
RESPIRATION RATE: 12 BRPM | SYSTOLIC BLOOD PRESSURE: 102 MMHG | TEMPERATURE: 98.4 F | BODY MASS INDEX: 20.51 KG/M2 | WEIGHT: 127.6 LBS | OXYGEN SATURATION: 97 % | HEART RATE: 60 BPM | HEIGHT: 66 IN | DIASTOLIC BLOOD PRESSURE: 60 MMHG

## 2023-08-01 DIAGNOSIS — E78.5 DYSLIPIDEMIA: ICD-10-CM

## 2023-08-01 DIAGNOSIS — Z00.00 WELL ADULT EXAM: ICD-10-CM

## 2023-08-01 DIAGNOSIS — R14.3 EXCESSIVE GAS: ICD-10-CM

## 2023-08-01 PROCEDURE — 3078F DIAST BP <80 MM HG: CPT | Performed by: FAMILY MEDICINE

## 2023-08-01 PROCEDURE — 3074F SYST BP LT 130 MM HG: CPT | Performed by: FAMILY MEDICINE

## 2023-08-01 PROCEDURE — 99396 PREV VISIT EST AGE 40-64: CPT | Performed by: FAMILY MEDICINE

## 2023-08-01 ASSESSMENT — FIBROSIS 4 INDEX: FIB4 SCORE: 1.89

## 2023-08-01 ASSESSMENT — PATIENT HEALTH QUESTIONNAIRE - PHQ9: CLINICAL INTERPRETATION OF PHQ2 SCORE: 0

## 2023-08-01 NOTE — PROGRESS NOTES
Subjective:     CC:   Chief Complaint   Patient presents with    Annual Exam       HPI:   Venkata Funes is a 62 y.o. male who presents for an annual exam.     Last colonoscopy: 2022  Last Tdap: 2021   Qualify for abdominal us screen: No  Qualify for hep c screen:done 2018  Regular exercise: yes -very active.  Running, biking, hockey  Diet: Overall healthy appearing.  Has noticed some excessive gas after dinner and has been trying to modify diet    The 10-year ASCVD risk score (Chip NICHOLS, et al., 2019) is: 7.5%  CAC score 0.0 in 2019.      He  has a past medical history of Allergic rhinitis, Allergy, Anxiety, Back pain, Cataract, Chickenpox, Herniated lumbar intervertebral disc, Hoarseness, persistent, Nasal drainage, Painful joint, Rhinitis, Sinusitis, Sore muscles, and Wears glasses.  He  has a past surgical history that includes cataract extraction with iol; nasal septal reconstruction; vasectomy; eye surgery (Left); pr remv 2nd cataract,corn-scler sectn; and sinuscope.  Family History   Problem Relation Age of Onset    Depression Daughter     Heart Failure Father     Cancer Father         My father was successfully treated for prostrate cancer.    Colon Cancer Father         My father had a cholectomy upon a diagnosis of Squamous cell carcinoma. The surgery led to a heart attack a day or two later while recovering in the hospital.    Prostate cancer Father         My father was successfully treated for prostrate cancer.    Alzheimer's Disease Maternal Grandmother     Stroke Neg Hx      Social History     Tobacco Use    Smoking status: Never    Smokeless tobacco: Never   Vaping Use    Vaping Use: Never used   Substance Use Topics    Alcohol use: Yes     Alcohol/week: 4.2 oz     Types: 7 Cans of beer per week     Comment: 1 beer most days. Non-alcoholic beer about half the time.    Drug use: No       Patient Active Problem List    Diagnosis Date Noted    Skin cancer screening 02/02/2022    Ganglion cyst  01/12/2021    Left elbow pain 01/12/2021    Left knee pain 01/12/2021    Allergic rhinitis 11/25/2020    Seborrheic keratosis 11/12/2019    Contact dermatitis 03/21/2019    DONNIE (obstructive sleep apnea)-mild 01/10/2019    Dyslipidemia 07/09/2018    Healthcare maintenance 07/09/2018    Sinusitis 05/09/2018    Hair loss 05/09/2018    Anxiety 05/09/2018       Current Outpatient Medications   Medication Sig Dispense Refill    Nutritional Supplements (CREATINE) 750 MG Cap       NON SPECIFIED holy basil leaf extract      GLUTAMINE PO Take  by mouth.      NON SPECIFIED BCAA Amino Acids      diphenhydrAMINE-APAP, sleep, (TYLENOL PM EXTRA STRENGTH PO) Take 1 Tab by mouth every 3 days.      naproxen (ANAPROX) 220 MG tablet Take 220 mg by mouth 2 times a day with meals.      TURMERIC PO Take 2 Caps by mouth every day.      Omega-3 Fatty Acids (FISH OIL) 1200 MG Cap Take 2 Caps by mouth every day.      Calcium-Magnesium-Vitamin D (CALCIUM MAGNESIUM PO) Take 1 Tab by mouth every day.      Multiple Vitamin (MULTI-VITAMIN PO) Take  by mouth.      ibuprofen (MOTRIN) 200 MG Tab Take 200 mg by mouth every 6 hours as needed.      NON SPECIFIED Tribulus terrestris extract       No current facility-administered medications for this visit.    (including changes today)  Allergies: Dairy food allergy and Other drug    Review of Systems   Constitutional: Negative for fever, chills and malaise/fatigue.   HENT: Negative for congestion.    Eyes: Negative for pain.   Respiratory: Negative for cough and shortness of breath.    Cardiovascular: Negative for leg swelling.   Gastrointestinal: Negative for nausea, vomiting, abdominal pain and diarrhea.   Genitourinary: Negative for dysuria and hematuria.   Skin: Negative for rash.   Neurological: Negative for dizziness, focal weakness and headaches.   Endo/Heme/Allergies: Does not bruise/bleed easily.   Psychiatric/Behavioral: Negative for depression.  The patient is not nervous/anxious.   "    Objective:     /60 (BP Location: Right arm, Patient Position: Sitting, BP Cuff Size: Adult)   Pulse 60   Temp 36.9 °C (98.4 °F)   Resp 12   Ht 1.676 m (5' 6\")   Wt 57.9 kg (127 lb 9.6 oz)   SpO2 97%   BMI 20.60 kg/m²   Body mass index is 20.6 kg/m².  Wt Readings from Last 4 Encounters:   08/01/23 57.9 kg (127 lb 9.6 oz)   08/29/22 62.1 kg (136 lb 14.5 oz)   07/28/22 60.2 kg (132 lb 12.8 oz)   05/17/22 59.3 kg (130 lb 11.7 oz)       Physical Exam:  Constitutional: Well-developed and well-nourished. Not diaphoretic. No distress.   Skin: Skin is warm and dry. No rash noted.  Head: Atraumatic without lesions.  Eyes: Conjunctivae and extraocular motions are normal. Pupils are equal, round, and reactive to light. No scleral icterus.   Ears:  External ears unremarkable. T  Nose: Nares patent. Septum midline. Turbinates without erythema nor edema. No discharge.   Mouth/Throat: Dentition is normal. Tongue normal. Oropharynx is clear and moist. Posterior pharynx without erythema or exudates.  Neck: Supple, trachea midline. Normal range of motion.   Cardiovascular: Regular rate and rhythm, S1 and S2 without murmur, rubs, or gallops.    Chest: Effort normal. Clear to auscultation throughout.  Extremities: No cyanosis, clubbing, erythema, nor edema. Distal pulses intact and symmetric.   Musculoskeletal: All major joints AROM full in all directions without pain.  Neurological: Moves all 4 extremities.  No facial droop.  Psychiatric:  Behavior, mood, and affect are appropriate.    Assessment and Plan:     1. Well adult exam  Health maintenance reviewed and updated.  Age-appropriate anticipatory guidance provided.    2. Dyslipidemia  The 10-year ASCVD risk score (Chip NICHOLS, et al., 2019) is: 7.5%  CAC score of 0.0 2019.  Very active, healthy diet.  Holding on statin and will plan to repeat check cholesterol next year with repeat CAC score if still elevated.    3. Excessive gas  Discussed dietary modification.  Try " low FODMAP.  OTC simethicone as needed.    Follow-up: Return in about 1 year (around 8/1/2024), or if symptoms worsen or fail to improve.    Please note that this note was created using voice recognition software.